# Patient Record
Sex: MALE | Race: WHITE | Employment: FULL TIME | URBAN - METROPOLITAN AREA
[De-identification: names, ages, dates, MRNs, and addresses within clinical notes are randomized per-mention and may not be internally consistent; named-entity substitution may affect disease eponyms.]

---

## 2017-11-20 PROBLEM — Z86.718 HISTORY OF DVT (DEEP VEIN THROMBOSIS): Status: ACTIVE | Noted: 2017-11-20

## 2017-11-21 ENCOUNTER — OFFICE VISIT (OUTPATIENT)
Dept: FAMILY MEDICINE CLINIC | Facility: CLINIC | Age: 47
End: 2017-11-21

## 2017-11-21 ENCOUNTER — HOSPITAL ENCOUNTER (OUTPATIENT)
Dept: ULTRASOUND IMAGING | Facility: HOSPITAL | Age: 47
Discharge: HOME | End: 2017-11-21
Attending: FAMILY MEDICINE
Payer: COMMERCIAL

## 2017-11-21 VITALS
HEART RATE: 66 BPM | DIASTOLIC BLOOD PRESSURE: 86 MMHG | BODY MASS INDEX: 27.2 KG/M2 | HEIGHT: 70 IN | SYSTOLIC BLOOD PRESSURE: 122 MMHG | OXYGEN SATURATION: 99 % | WEIGHT: 190 LBS | TEMPERATURE: 99 F

## 2017-11-21 DIAGNOSIS — Z23 FLU VACCINE NEED: ICD-10-CM

## 2017-11-21 DIAGNOSIS — Z86.718 HISTORY OF DVT (DEEP VEIN THROMBOSIS): ICD-10-CM

## 2017-11-21 DIAGNOSIS — M79.661 RIGHT CALF PAIN: ICD-10-CM

## 2017-11-21 DIAGNOSIS — M79.661 RIGHT CALF PAIN: Primary | ICD-10-CM

## 2017-11-21 PROCEDURE — 99203 OFFICE O/P NEW LOW 30 MIN: CPT | Performed by: FAMILY MEDICINE

## 2017-11-21 PROCEDURE — 93971 EXTREMITY STUDY: CPT | Performed by: FAMILY MEDICINE

## 2017-11-21 RX ORDER — WARFARIN SODIUM 10 MG/1
TABLET ORAL
Refills: 2 | COMMUNITY
Start: 2017-08-26 | End: 2018-01-16 | Stop reason: ALTCHOICE

## 2017-11-21 NOTE — PROGRESS NOTES
HPI:   Patient presents with:  Leg Pain: R leg had 2 DVT       Cris Ortiz is a 56year old male.     He primarily presents for:  Right posterior lower leg/calf tenderness and tingling, for a few days, patient with history of DVTs ×2 in similar locati palpitations  LUNG:  No SOB, cough or wheeze  GI:  No abdominal pain.   No GI/rectal bleeding, No N/V/D/C  :  No dysuria/hematuria  MS:  No joint pain or swelling B  NEURO: No numbness, tingling or weakness  PSYCH:  No mood concerns     EXAM:   /86 REFERRAL TO [address] [place] [address] HEMATOLOGY/ONCOLOGY GROUP    3. Flu vaccine need  Patient declined      The patient indicates understanding of the above recommendations and agrees to the above plan.   Follow up: as above      Orders Placed This Encounter      F

## 2017-11-22 NOTE — PROGRESS NOTES
I reviewed the results with the patient, patient aware and agrees to follow up plan: ECASA 325 mg qd, stop if SE/GERD, abd pain, etc

## 2018-01-16 ENCOUNTER — OFFICE VISIT (OUTPATIENT)
Dept: FAMILY MEDICINE CLINIC | Facility: CLINIC | Age: 48
End: 2018-01-16

## 2018-01-16 VITALS
DIASTOLIC BLOOD PRESSURE: 84 MMHG | BODY MASS INDEX: 27.92 KG/M2 | OXYGEN SATURATION: 99 % | TEMPERATURE: 98 F | WEIGHT: 195 LBS | HEIGHT: 70 IN | HEART RATE: 84 BPM | SYSTOLIC BLOOD PRESSURE: 130 MMHG

## 2018-01-16 DIAGNOSIS — M79.661 RIGHT CALF PAIN: Primary | ICD-10-CM

## 2018-01-16 DIAGNOSIS — I73.9 PVD (PERIPHERAL VASCULAR DISEASE) (HCC): ICD-10-CM

## 2018-01-16 DIAGNOSIS — R53.82 CHRONIC FATIGUE: ICD-10-CM

## 2018-01-16 DIAGNOSIS — G47.33 OBSTRUCTIVE SLEEP APNEA SYNDROME: ICD-10-CM

## 2018-01-16 DIAGNOSIS — Z86.718 HISTORY OF DVT (DEEP VEIN THROMBOSIS): ICD-10-CM

## 2018-01-16 DIAGNOSIS — L71.9 ROSACEA: ICD-10-CM

## 2018-01-16 PROCEDURE — 99214 OFFICE O/P EST MOD 30 MIN: CPT | Performed by: FAMILY MEDICINE

## 2018-01-16 RX ORDER — METRONIDAZOLE 10 MG/G
GEL TOPICAL
Qty: 60 G | Refills: 2 | Status: SHIPPED | OUTPATIENT
Start: 2018-01-16 | End: 2018-06-04

## 2018-01-16 RX ORDER — ASPIRIN 325 MG
325 TABLET ORAL DAILY
COMMUNITY
End: 2018-06-04

## 2018-01-16 NOTE — PROGRESS NOTES
HPI:   Patient presents with:  Deep Vein Thrombosis (cardiovascular)  Fatigue      Cris Ortiz is a 53year old male. He primarily presents for:  leg pain. Location of pain: Right calf. Has had pain for  2 months+.   Pain was caused by no known Outpatient Prescriptions:  aspirin 325 MG Oral Tab Take 325 mg by mouth daily.  Disp:  Rfl:    MetroNIDAZOLE 1 % External Gel Apply once to twice daily to AA of face Disp: 60 g Rfl: 2      Past Medical History:   Diagnosis Date   • DVT (deep venous thrombos tender posteriorly, no induration, no masses, no group B pains, no redness, no calor, no pitting edema bilaterally, no CCE left lower extremity; Brachial and PT/DP pulses wnl B  GI: NABS  NEURO: A&O x 3, CN II-XII intact B, motor and sensory grossly intact Apply once to twice daily to AA of face  Dispense: 60 g; Refill: 2    6.  PVD (peripheral vascular disease) (HCC)  Right lower extremity, status post DVT, recommend support stockings traveling/flying, reduce salt, elevate immobile, follow-up as needed

## 2018-02-20 ENCOUNTER — OFFICE VISIT (OUTPATIENT)
Dept: SLEEP CENTER | Age: 48
End: 2018-02-20
Attending: FAMILY MEDICINE
Payer: COMMERCIAL

## 2018-02-20 DIAGNOSIS — G47.33 OSA (OBSTRUCTIVE SLEEP APNEA): Primary | ICD-10-CM

## 2018-02-20 PROCEDURE — 95806 SLEEP STUDY UNATT&RESP EFFT: CPT

## 2018-02-25 NOTE — PROCEDURES
[facility]  Accredited by the [facility] ([facility])    PATIENT'S NAME: Vanessa Johnston   ATTENDING PHYSICIAN: Minnie Kumar MD   REFERRING PHYSICIAN: Minnie Kumar MD   PATIENT ACCOUNT #: [id number] beats per minute. The maximum heart rate was 114 beats per minute. INTERPRETATION:  The data generated from this study is consistent with moderate obstructive sleep apnea (ICD 10 code 47.33). RECOMMENDATIONS:    1. CPAP titration.   2.   Avoid alco

## 2018-03-09 ENCOUNTER — PATIENT MESSAGE (OUTPATIENT)
Dept: FAMILY MEDICINE CLINIC | Facility: CLINIC | Age: 48
End: 2018-03-09

## 2018-03-09 DIAGNOSIS — G47.33 OSA (OBSTRUCTIVE SLEEP APNEA): Primary | ICD-10-CM

## 2018-03-12 PROBLEM — G47.33 OSA (OBSTRUCTIVE SLEEP APNEA): Status: ACTIVE | Noted: 2018-03-12

## 2018-03-15 NOTE — TELEPHONE ENCOUNTER
Yael Kelley MD 3/13/2018 8:23 PM CDT    ----- Message from Peterson Sandoval MD sent at 3/13/2018 8:23 PM CDT -----  Please contact pt with Sleep center/CPAP titration order info-see referral and his emails to me  I believe the sleep

## 2018-03-15 NOTE — TELEPHONE ENCOUNTER
Spoke to pt, he was given the information for the [facility]; pt to call and schedule overnight study.

## 2018-06-04 ENCOUNTER — OFFICE VISIT (OUTPATIENT)
Dept: FAMILY MEDICINE CLINIC | Facility: CLINIC | Age: 48
End: 2018-06-04

## 2018-06-04 ENCOUNTER — HOSPITAL ENCOUNTER (OUTPATIENT)
Dept: ULTRASOUND IMAGING | Facility: HOSPITAL | Age: 48
Discharge: HOME | End: 2018-06-04
Attending: FAMILY MEDICINE
Payer: COMMERCIAL

## 2018-06-04 ENCOUNTER — HOSPITAL ENCOUNTER (EMERGENCY)
Facility: HOSPITAL | Age: 48
Discharge: HOME | End: 2018-06-04
Attending: EMERGENCY MEDICINE
Payer: COMMERCIAL

## 2018-06-04 VITALS
TEMPERATURE: 98 F | SYSTOLIC BLOOD PRESSURE: 119 MMHG | WEIGHT: 196 LBS | RESPIRATION RATE: 14 BRPM | BODY MASS INDEX: 28 KG/M2 | OXYGEN SATURATION: 99 % | DIASTOLIC BLOOD PRESSURE: 78 MMHG | HEART RATE: 62 BPM

## 2018-06-04 VITALS
RESPIRATION RATE: 18 BRPM | OXYGEN SATURATION: 98 % | HEIGHT: 70 IN | BODY MASS INDEX: 27.92 KG/M2 | HEART RATE: 89 BPM | SYSTOLIC BLOOD PRESSURE: 142 MMHG | DIASTOLIC BLOOD PRESSURE: 86 MMHG | WEIGHT: 195 LBS

## 2018-06-04 DIAGNOSIS — Z86.718 HISTORY OF DVT (DEEP VEIN THROMBOSIS): ICD-10-CM

## 2018-06-04 DIAGNOSIS — M79.605 LEFT LEG PAIN: ICD-10-CM

## 2018-06-04 DIAGNOSIS — I82.4Y2 ACUTE DEEP VEIN THROMBOSIS (DVT) OF PROXIMAL VEIN OF LEFT LOWER EXTREMITY (HCC): Primary | ICD-10-CM

## 2018-06-04 DIAGNOSIS — M79.605 LEFT LEG PAIN: Primary | ICD-10-CM

## 2018-06-04 PROCEDURE — 36415 COLL VENOUS BLD VENIPUNCTURE: CPT

## 2018-06-04 PROCEDURE — 99214 OFFICE O/P EST MOD 30 MIN: CPT | Performed by: FAMILY MEDICINE

## 2018-06-04 PROCEDURE — 93971 EXTREMITY STUDY: CPT | Performed by: FAMILY MEDICINE

## 2018-06-04 PROCEDURE — 85025 COMPLETE CBC W/AUTO DIFF WBC: CPT | Performed by: EMERGENCY MEDICINE

## 2018-06-04 PROCEDURE — 99283 EMERGENCY DEPT VISIT LOW MDM: CPT

## 2018-06-04 PROCEDURE — 80048 BASIC METABOLIC PNL TOTAL CA: CPT | Performed by: EMERGENCY MEDICINE

## 2018-06-04 RX ORDER — KETOCONAZOLE 20 MG/G
CREAM TOPICAL DAILY
COMMUNITY
End: 2018-08-22

## 2018-06-04 NOTE — ED NOTES
Assumed care of pt from triage. Pt states he awoke with pain behind left knee. Pt states he has 2 previous DVTs and has been on coumadin and lovenox in the past.  Pt states he is only currently taking Aspirin 325 mg PO. Pedal pulses 2+ b/l.   Pt is AOx4,

## 2018-06-04 NOTE — PROGRESS NOTES
HPI: 53year old male presents c/o L posterior leg pain x 1 day. BP elevated however in pain. Worse when walking or any L leg movement, improved with rest. Hx of DVT in R leg x2. Is taking ASA 325mg daily.  Says he had a prior hypercoag w/u which was negati B/L.  Skin: Skin color, texture, turgor normal. No rashes or lesions. Over 50% of this 25 minute visit was spent on counseling and coordination of care.     IMPRESSION:  L Leg Pain - r/o DVT  Hx of DVT '10 - on ASA 325mg qd    Patient Active Problem List

## 2018-06-05 NOTE — ED PROVIDER NOTES
Patient Seen in: [facility] Emergency Department    History   Patient presents with:  Deep Vein Thrombosis (cardiovascular)    Stated Complaint: Possible DVT     HPI    The patient is a 58-year-old male who presents with an acute DVT to his left l Oropharynx is clear and moist.   Eyes: Conjunctivae are normal.   Neck: Normal range of motion. Neck supple. Cardiovascular: Normal rate, regular rhythm, normal heart sounds and intact distal pulses.     Pulmonary/Chest: Effort normal and breath sounds no Acute-appearing occlusive deep venous thrombus involving the left popliteal vein. There is also extension of thrombus into superficial veins in the proximal left calf (superficial saphenous and gastrocnemius veins).      Dictated by (CST): RASHAWN Krueger

## 2018-06-25 ENCOUNTER — PATIENT MESSAGE (OUTPATIENT)
Dept: FAMILY MEDICINE CLINIC | Facility: CLINIC | Age: 48
End: 2018-06-25

## 2018-07-02 ENCOUNTER — PATIENT MESSAGE (OUTPATIENT)
Dept: FAMILY MEDICINE CLINIC | Facility: CLINIC | Age: 48
End: 2018-07-02

## 2018-07-02 NOTE — TELEPHONE ENCOUNTER
From: Yan Puentes  To: Leon Gusman MD  Sent: 7/2/2018 8:07 AM CDT  Subject: Prescription Question    Can you send prescription for Xarelto to [facility] on [address] in [place]?  My prescription from the Emergency Room doctor is

## 2018-07-06 NOTE — TELEPHONE ENCOUNTER
A refill request was received for:    Pending Prescriptions Disp Refills    rivaroxaban 15 MG Oral Tab 30 tablet 0     Sig: Take 1 tablet (15 mg total) by mouth daily with food.          Last refill date:  pt received after ER visit 6/4/18  Qty: 42 (to acco

## 2018-07-31 RX ORDER — RIVAROXABAN 15 MG/1
TABLET, FILM COATED ORAL
Qty: 30 TABLET | Refills: 0 | Status: SHIPPED | OUTPATIENT
Start: 2018-07-31 | End: 2019-01-29 | Stop reason: ALTCHOICE

## 2018-07-31 NOTE — TELEPHONE ENCOUNTER
A refill request was received for:    Pending Prescriptions Disp Refills    XARELTO 15 MG Oral Tab [Pharmacy Med Name: XARELTO 15MG TABLETS] 30 tablet 0      Sig: TAKE 1 TABLET(15 MG) BY MOUTH DAILY WITH FOOD         Last refill date: 7/6/18  Qty: 30  Last

## 2018-08-22 ENCOUNTER — OFFICE VISIT (OUTPATIENT)
Dept: FAMILY MEDICINE CLINIC | Facility: CLINIC | Age: 48
End: 2018-08-22
Payer: COMMERCIAL

## 2018-08-22 VITALS
HEART RATE: 108 BPM | WEIGHT: 190 LBS | SYSTOLIC BLOOD PRESSURE: 132 MMHG | HEIGHT: 70 IN | BODY MASS INDEX: 27.2 KG/M2 | RESPIRATION RATE: 18 BRPM | OXYGEN SATURATION: 97 % | DIASTOLIC BLOOD PRESSURE: 82 MMHG

## 2018-08-22 DIAGNOSIS — Z86.718 HISTORY OF DVT (DEEP VEIN THROMBOSIS): Primary | ICD-10-CM

## 2018-08-22 DIAGNOSIS — Z86.2 HISTORY OF COAGULOPATHY: ICD-10-CM

## 2018-08-22 DIAGNOSIS — Z79.899 LONG-TERM USE OF HIGH-RISK MEDICATION: ICD-10-CM

## 2018-08-22 DIAGNOSIS — L21.9 SEBORRHEIC DERMATITIS: ICD-10-CM

## 2018-08-22 DIAGNOSIS — F41.1 GENERALIZED ANXIETY DISORDER: ICD-10-CM

## 2018-08-22 PROCEDURE — 99214 OFFICE O/P EST MOD 30 MIN: CPT | Performed by: FAMILY MEDICINE

## 2018-08-22 RX ORDER — PROPRANOLOL HYDROCHLORIDE 10 MG/1
TABLET ORAL
Qty: 180 TABLET | Refills: 0 | Status: SHIPPED | OUTPATIENT
Start: 2018-08-22 | End: 2018-12-06

## 2018-08-22 RX ORDER — KETOCONAZOLE 20 MG/G
1 CREAM TOPICAL DAILY
Qty: 60 G | Refills: 2 | Status: SHIPPED | OUTPATIENT
Start: 2018-08-22 | End: 2019-11-25

## 2018-08-23 PROBLEM — F41.1 GENERALIZED ANXIETY DISORDER: Status: ACTIVE | Noted: 2018-08-23

## 2018-08-23 PROBLEM — L21.9 SEBORRHEIC DERMATITIS: Status: ACTIVE | Noted: 2018-08-23

## 2018-08-23 PROBLEM — Z79.899 LONG-TERM USE OF HIGH-RISK MEDICATION: Status: ACTIVE | Noted: 2018-08-23

## 2018-08-23 NOTE — PROGRESS NOTES
HPI:   Patient presents with:  Deep Vein Thrombosis (cardiovascular): patient denies leg pain today  Anxiety  Derm Problem      Eugene Marinelli is a 53year old male.     He primarily presents for:  Follow-up status post recent onset left DVT, patient wit 06/04/2018 06:15 PM   CO2 25 06/04/2018 06:15 PM   CREATSERUM 0.83 06/04/2018 06:15 PM   CA 9.6 06/04/2018 06:15 PM            Medications:    Current Outpatient Prescriptions:  Azelaic Acid 20 % External Cream Apply topically 2 (two) times daily.  Disp:  R nourished, male  in no apparent distress  SKIN: skin color wnl,  no suspicious lesions, few scattered flaky erythematous patches face  HEENT: atraumatic, normocephalic, nose clear; throat clear; dentition good   EARS: canals clear B, TM: wnl B  EYES: PERRL REFERRAL TO [facility] HEMATOLOGY/ONCOLOGY GROUP    3. Generalized anxiety disorder  Patient with worsening anxiety, especially situational, at work when public speaking, discussed treatment options, recommend propranolol 10 mg, 1-2 mg every morning a

## 2018-09-27 ENCOUNTER — PATIENT MESSAGE (OUTPATIENT)
Dept: FAMILY MEDICINE CLINIC | Facility: CLINIC | Age: 48
End: 2018-09-27

## 2018-12-06 DIAGNOSIS — F41.1 GENERALIZED ANXIETY DISORDER: ICD-10-CM

## 2018-12-07 RX ORDER — PROPRANOLOL HYDROCHLORIDE 10 MG/1
TABLET ORAL
Qty: 180 TABLET | Refills: 0 | Status: SHIPPED | OUTPATIENT
Start: 2018-12-07 | End: 2019-05-20

## 2019-01-29 ENCOUNTER — OFFICE VISIT (OUTPATIENT)
Dept: FAMILY MEDICINE CLINIC | Facility: CLINIC | Age: 49
End: 2019-01-29
Payer: COMMERCIAL

## 2019-01-29 VITALS
HEIGHT: 69.5 IN | HEART RATE: 97 BPM | BODY MASS INDEX: 27.65 KG/M2 | OXYGEN SATURATION: 98 % | RESPIRATION RATE: 18 BRPM | DIASTOLIC BLOOD PRESSURE: 82 MMHG | WEIGHT: 191 LBS | SYSTOLIC BLOOD PRESSURE: 126 MMHG

## 2019-01-29 DIAGNOSIS — Z79.899 LONG-TERM USE OF HIGH-RISK MEDICATION: ICD-10-CM

## 2019-01-29 DIAGNOSIS — Z00.00 ROUTINE MEDICAL EXAM: Primary | ICD-10-CM

## 2019-01-29 DIAGNOSIS — Z12.11 SCREENING FOR COLON CANCER: ICD-10-CM

## 2019-01-29 DIAGNOSIS — L21.9 SEBORRHEIC DERMATITIS: ICD-10-CM

## 2019-01-29 DIAGNOSIS — Z86.718 HISTORY OF DVT (DEEP VEIN THROMBOSIS): ICD-10-CM

## 2019-01-29 DIAGNOSIS — F41.1 GENERALIZED ANXIETY DISORDER: ICD-10-CM

## 2019-01-29 DIAGNOSIS — Z79.01 ON ANTICOAGULANT THERAPY: ICD-10-CM

## 2019-01-29 DIAGNOSIS — Z12.5 PROSTATE CANCER SCREENING: ICD-10-CM

## 2019-01-29 PROCEDURE — 99213 OFFICE O/P EST LOW 20 MIN: CPT | Performed by: FAMILY MEDICINE

## 2019-01-29 PROCEDURE — 99396 PREV VISIT EST AGE 40-64: CPT | Performed by: FAMILY MEDICINE

## 2019-01-29 PROCEDURE — 82274 ASSAY TEST FOR BLOOD FECAL: CPT | Performed by: FAMILY MEDICINE

## 2019-01-29 RX ORDER — BETAMETHASONE DIPROPIONATE 0.5 MG/G
LOTION TOPICAL
Qty: 60 ML | Refills: 1 | Status: SHIPPED | OUTPATIENT
Start: 2019-01-29 | End: 2019-11-25

## 2019-01-30 PROBLEM — Z79.01 ON ANTICOAGULANT THERAPY: Status: ACTIVE | Noted: 2019-01-30

## 2019-01-30 LAB — HEMOCCULT STL QL: NEGATIVE

## 2019-01-30 NOTE — H&P
HPI:   Patient presents with:  Physical  Deep Vein Thrombosis (cardiovascular)  Anxiety  Derm Problem      Edgar Salcedo is a 51year old male who presents for a complete physical exam.     Patient has complaints of:  Anxiety, generalized and situation Tab Take 1 tablet (5 mg total) by mouth 2 (two) times daily. Disp: 180 tablet Rfl: 0   ketoconazole 2 % External Cream Apply 1 Application topically daily.  Disp: 60 g Rfl: 2   Propranolol HCl 10 MG Oral Tab Take 1-2 tablets by mouth bid prn Disp: 180 table male in no apparent distress  SKIN: no suspicious lesions, skin color wnl  HEENT: atraumatic, normocephalic, nose and throat are clear; dentition good, EARS: canals clear, TM wnl B  EYES:PERRLA, EOMI, conjunctiva are clear, no discharge; no nystagmus  NECK COMP METABOLIC PANEL (14); Future  - CBC WITH DIFFERENTIAL WITH PLATELET; Future  - LIPID PANEL; Future  - PSA SCREEN; Future  - CT CALCIUM SCORING; Future    2. Prostate cancer screening  SA    3.  Screening for colon cancer  SA  - OCCULT BLOOD, FECAL, IMM

## 2019-01-31 NOTE — TELEPHONE ENCOUNTER
A refill request was received for:  Requested Prescriptions     Pending Prescriptions Disp Refills   • ELIQUIS 5 MG Oral Tab [Pharmacy Med Name: ELIQUIS 5MG TABLETS] 180 tablet 0     Sig: TAKE 1 TABLET(5 MG) BY MOUTH TWICE DAILY     Last refill date: 9/28/

## 2019-02-01 RX ORDER — APIXABAN 5 MG/1
TABLET, FILM COATED ORAL
Qty: 180 TABLET | Refills: 0 | Status: SHIPPED | OUTPATIENT
Start: 2019-02-01 | End: 2019-05-20

## 2019-02-04 ENCOUNTER — TELEPHONE (OUTPATIENT)
Dept: FAMILY MEDICINE CLINIC | Facility: CLINIC | Age: 49
End: 2019-02-04

## 2019-02-04 NOTE — TELEPHONE ENCOUNTER
Pt's wife calling, states she went to p/u Eliquis from pharmacy, cost is $1300 due to insurance deductible. Wife asking if there is an alternative to consider.  Wife given information for GoodRx (appears rx would cost $450 with GoodRx coupon), she will look

## 2019-02-08 NOTE — TELEPHONE ENCOUNTER
Called again, no answer, lmovm to wife Sandra Forman. Messaged patient via [employer] regarding rx alternative and to contact office.

## 2019-02-11 NOTE — TELEPHONE ENCOUNTER
ARACELIS to return call. Noticed [employer] message sent by MA was answered by the patient.

## 2019-05-17 ENCOUNTER — LAB ENCOUNTER (OUTPATIENT)
Dept: LAB | Facility: REFERENCE LAB | Age: 49
End: 2019-05-17
Attending: FAMILY MEDICINE
Payer: COMMERCIAL

## 2019-05-17 DIAGNOSIS — Z86.718 HISTORY OF DVT (DEEP VEIN THROMBOSIS): ICD-10-CM

## 2019-05-17 DIAGNOSIS — Z00.00 ROUTINE MEDICAL EXAM: ICD-10-CM

## 2019-05-17 DIAGNOSIS — Z79.01 ON ANTICOAGULANT THERAPY: ICD-10-CM

## 2019-05-17 DIAGNOSIS — Z79.899 LONG-TERM USE OF HIGH-RISK MEDICATION: ICD-10-CM

## 2019-05-17 PROCEDURE — 85025 COMPLETE CBC W/AUTO DIFF WBC: CPT | Performed by: FAMILY MEDICINE

## 2019-05-17 PROCEDURE — 36415 COLL VENOUS BLD VENIPUNCTURE: CPT | Performed by: FAMILY MEDICINE

## 2019-05-17 PROCEDURE — 80053 COMPREHEN METABOLIC PANEL: CPT | Performed by: FAMILY MEDICINE

## 2019-05-17 PROCEDURE — 84153 ASSAY OF PSA TOTAL: CPT | Performed by: FAMILY MEDICINE

## 2019-05-17 PROCEDURE — 80061 LIPID PANEL: CPT | Performed by: FAMILY MEDICINE

## 2019-05-20 DIAGNOSIS — F41.1 GENERALIZED ANXIETY DISORDER: ICD-10-CM

## 2019-05-21 RX ORDER — PROPRANOLOL HYDROCHLORIDE 10 MG/1
TABLET ORAL
Qty: 180 TABLET | Refills: 0 | Status: SHIPPED | OUTPATIENT
Start: 2019-05-21 | End: 2019-09-11

## 2019-05-21 RX ORDER — APIXABAN 5 MG/1
TABLET, FILM COATED ORAL
Qty: 180 TABLET | Refills: 0 | Status: SHIPPED | OUTPATIENT
Start: 2019-05-21 | End: 2019-09-11

## 2019-05-21 NOTE — TELEPHONE ENCOUNTER
A refill request was received for:  Requested Prescriptions     Pending Prescriptions Disp Refills   • ELIQUIS 5 MG Oral Tab [Pharmacy Med Name: ELIQUIS 5MG TABLETS] 180 tablet 0     Sig: TAKE 1 TABLET(5 MG) BY MOUTH TWICE DAILY     Last refill date:  2/1/

## 2019-08-23 ENCOUNTER — OFFICE VISIT (OUTPATIENT)
Dept: FAMILY MEDICINE CLINIC | Facility: CLINIC | Age: 49
End: 2019-08-23
Payer: COMMERCIAL

## 2019-08-23 VITALS
OXYGEN SATURATION: 98 % | HEART RATE: 68 BPM | RESPIRATION RATE: 18 BRPM | TEMPERATURE: 98 F | WEIGHT: 196 LBS | DIASTOLIC BLOOD PRESSURE: 82 MMHG | HEIGHT: 70 IN | BODY MASS INDEX: 28.06 KG/M2 | SYSTOLIC BLOOD PRESSURE: 112 MMHG

## 2019-08-23 DIAGNOSIS — Z79.899 LONG-TERM USE OF HIGH-RISK MEDICATION: ICD-10-CM

## 2019-08-23 DIAGNOSIS — F41.1 GENERALIZED ANXIETY DISORDER: ICD-10-CM

## 2019-08-23 DIAGNOSIS — Z79.01 ON ANTICOAGULANT THERAPY: ICD-10-CM

## 2019-08-23 DIAGNOSIS — G47.33 OSA (OBSTRUCTIVE SLEEP APNEA): ICD-10-CM

## 2019-08-23 DIAGNOSIS — L57.0 AK (ACTINIC KERATOSIS): ICD-10-CM

## 2019-08-23 DIAGNOSIS — Z86.718 HISTORY OF DVT (DEEP VEIN THROMBOSIS): Primary | ICD-10-CM

## 2019-08-23 DIAGNOSIS — Z87.891 FORMER TOBACCO USE: ICD-10-CM

## 2019-08-23 DIAGNOSIS — L71.9 ROSACEA: ICD-10-CM

## 2019-08-23 DIAGNOSIS — L21.9 SEBORRHEIC DERMATITIS: ICD-10-CM

## 2019-08-23 PROCEDURE — 99214 OFFICE O/P EST MOD 30 MIN: CPT | Performed by: FAMILY MEDICINE

## 2019-08-23 RX ORDER — IVERMECTIN 10 MG/G
CREAM TOPICAL
Refills: 1 | COMMUNITY
Start: 2019-08-21 | End: 2019-08-23

## 2019-08-23 NOTE — PROGRESS NOTES
HPI:   Patient presents with: Follow - Up: would like to discuss his blood tests  Deep Vein Thrombosis (cardiovascular)  Anxiety  Derm Problem      Ashley Man is a 51year old male.     He primarily presents for:  Follow-up left lower extremity recu 05/17/2019 08:32 AM    CA 9.0 05/17/2019 08:32 AM    ALB 4.0 05/17/2019 08:32 AM    TP 7.3 05/17/2019 08:32 AM    ALKPHO 57 05/17/2019 08:32 AM    AST 13 (L) 05/17/2019 08:32 AM    ALT 26 05/17/2019 08:32 AM    BILT 0.5 05/17/2019 08:32 AM            Medic body mass index is 28.12 kg/m² as calculated from the following:    Height as of this encounter: 70\". Weight as of this encounter: 196 lb.    Wt Readings from Last 3 Encounters:  08/23/19 : 196 lb  01/29/19 : 191 lb  08/22/18 : 190 lb    GENERAL: well d follow-up next office visit    6. Seborrheic dermatitis  Face, associated with rosacea and history of actinic keratosis, overall improved, will continue present management, follow-up with dermatology as recommended and with me in 6 months    7.  Rosacea  Ov

## 2019-09-11 DIAGNOSIS — F41.1 GENERALIZED ANXIETY DISORDER: ICD-10-CM

## 2019-09-11 RX ORDER — APIXABAN 5 MG/1
TABLET, FILM COATED ORAL
Qty: 180 TABLET | Refills: 0 | Status: SHIPPED | OUTPATIENT
Start: 2019-09-11 | End: 2019-11-25

## 2019-09-11 RX ORDER — PROPRANOLOL HYDROCHLORIDE 10 MG/1
TABLET ORAL
Qty: 180 TABLET | Refills: 0 | Status: SHIPPED | OUTPATIENT
Start: 2019-09-11 | End: 2020-12-09

## 2019-09-11 NOTE — TELEPHONE ENCOUNTER
Rx passes protocol - rx refilled. HTN; CMP in past 15 m, last CR < 2, apt in past 6 m     Return in about 6 months (around 2/23/2020) for DVT/anxiety/dermatitis/30.

## 2019-09-11 NOTE — TELEPHONE ENCOUNTER
A refill request was received for:  Requested Prescriptions     Pending Prescriptions Disp Refills   • ELIQUIS 5 MG Oral Tab [Pharmacy Med Name: ELIQUIS 5MG TABLETS] 180 tablet 0     Sig: TAKE 1 TABLET(5 MG) BY MOUTH TWICE DAILY     Last refill date: 5/21/

## 2019-10-08 ENCOUNTER — PATIENT MESSAGE (OUTPATIENT)
Dept: FAMILY MEDICINE CLINIC | Facility: CLINIC | Age: 49
End: 2019-10-08

## 2019-10-11 NOTE — TELEPHONE ENCOUNTER
From: Jaylon Carrera  To: Dante Martins MD  Sent: 10/8/2019 4:46 PM CDT  Subject: Other    I got a health screening through work. Would you like the lab results for record? If so what email can I send to?   Thanks,

## 2019-10-11 NOTE — PROGRESS NOTES
Rcvd pt's work wellness labs; lab results entered into the external results console. Additional lab results on report.

## 2019-11-25 DIAGNOSIS — L21.9 SEBORRHEIC DERMATITIS: ICD-10-CM

## 2019-11-25 DIAGNOSIS — Z86.718 HISTORY OF DVT (DEEP VEIN THROMBOSIS): Primary | ICD-10-CM

## 2019-11-26 RX ORDER — BETAMETHASONE DIPROPIONATE 0.5 MG/G
LOTION TOPICAL
Qty: 60 ML | Refills: 1 | Status: SHIPPED | OUTPATIENT
Start: 2019-11-26 | End: 2020-12-09

## 2019-11-26 RX ORDER — KETOCONAZOLE 20 MG/G
1 CREAM TOPICAL DAILY
Qty: 60 G | Refills: 1 | Status: SHIPPED | OUTPATIENT
Start: 2019-11-26 | End: 2020-06-01

## 2019-11-26 NOTE — TELEPHONE ENCOUNTER
Call pt-LHK I sent in RF(s) and remind that he is due for follow up with me:    February 2020  I am booking out 4-6 weeks so make appointment now if able. Thanks!

## 2020-03-27 DIAGNOSIS — Z86.718 HISTORY OF DVT (DEEP VEIN THROMBOSIS): ICD-10-CM

## 2020-03-30 ENCOUNTER — TELEPHONE (OUTPATIENT)
Dept: FAMILY MEDICINE CLINIC | Facility: CLINIC | Age: 50
End: 2020-03-30

## 2020-03-30 NOTE — TELEPHONE ENCOUNTER
Patient due for f/u visit, was due in February 2020. Please schedule him prior to us sending this refill.

## 2020-03-31 ENCOUNTER — VIRTUAL PHONE E/M (OUTPATIENT)
Dept: FAMILY MEDICINE CLINIC | Facility: CLINIC | Age: 50
End: 2020-03-31
Payer: COMMERCIAL

## 2020-03-31 DIAGNOSIS — Z79.899 LONG-TERM USE OF HIGH-RISK MEDICATION: ICD-10-CM

## 2020-03-31 DIAGNOSIS — Z86.718 HISTORY OF DVT (DEEP VEIN THROMBOSIS): Primary | ICD-10-CM

## 2020-03-31 DIAGNOSIS — F41.1 GENERALIZED ANXIETY DISORDER: ICD-10-CM

## 2020-03-31 PROCEDURE — 99213 OFFICE O/P EST LOW 20 MIN: CPT | Performed by: NURSE PRACTITIONER

## 2020-03-31 RX ORDER — APIXABAN 5 MG/1
TABLET, FILM COATED ORAL
Qty: 180 TABLET | Refills: 0 | OUTPATIENT
Start: 2020-03-31

## 2020-03-31 NOTE — PROGRESS NOTES
Virtual/Telephone Check-In    Cris Ortiz verbally consents to a Virtual/Telephone Check-In service on 03/31/20. Patient understands and accepts financial responsibility for any deductible, co-insurance and/or co-pays associated with this service. Denies SHIP. · Follow up in office in September/October 2020. · Call and/or go to the ER if worsening symptoms including, but not limited to: respiratory distress, shortness of breath and  wheezing, worsening fever, cough and mental status.       The

## 2020-04-20 ENCOUNTER — PATIENT MESSAGE (OUTPATIENT)
Dept: FAMILY MEDICINE CLINIC | Facility: CLINIC | Age: 50
End: 2020-04-20

## 2020-04-20 DIAGNOSIS — Z86.718 HISTORY OF DVT (DEEP VEIN THROMBOSIS): ICD-10-CM

## 2020-04-20 NOTE — TELEPHONE ENCOUNTER
From: Chauncey Cerrato  To: Bjorn Salvador MD  Sent: 4/20/2020 10:24 AM CDT  Subject: Prescription Question    I found a generic alternative to Eliquis at [facility] [facility] [place] Is this safe?  Could I get a new prescription for 3 months to submit

## 2020-04-20 NOTE — TELEPHONE ENCOUNTER
See pt's request for Eliquis Rx to be sent to [address] [place], [place] [address]      A refill request was received for:  Requested Prescriptions     Pending Prescriptions Disp Refills   • apixaban (ELIQUIS) 5 MG Oral Tab 180 tablet 0     Sig: Take 1 tablet (5 mg total) by

## 2020-04-21 ENCOUNTER — PATIENT MESSAGE (OUTPATIENT)
Dept: FAMILY MEDICINE CLINIC | Facility: CLINIC | Age: 50
End: 2020-04-21

## 2020-04-21 NOTE — TELEPHONE ENCOUNTER
Regarding: Prescription Question  Contact: [phone]  ----- Message from Alayna Medrano MD sent at 4/21/2020 10:17 AM CDT -----       ----- Message from Shanna Palacio to Bi Mukherjee MD sent at 4/20/2020 10:24 AM -----   I fo

## 2020-04-21 NOTE — TELEPHONE ENCOUNTER
I called and left a message. I just wanted to check and see if you would like you Eliquis to be mailed to you, faxed to your pharmacy or you would  like to pick it up? Asked patient to call me back to let me know. Repeat BAL and re-eval

## 2020-05-03 DIAGNOSIS — Z86.718 HISTORY OF DVT (DEEP VEIN THROMBOSIS): ICD-10-CM

## 2020-05-04 ENCOUNTER — TELEPHONE (OUTPATIENT)
Dept: FAMILY MEDICINE CLINIC | Facility: CLINIC | Age: 50
End: 2020-05-04

## 2020-05-04 DIAGNOSIS — Z86.718 HISTORY OF DVT (DEEP VEIN THROMBOSIS): ICD-10-CM

## 2020-05-04 RX ORDER — APIXABAN 5 MG/1
TABLET, FILM COATED ORAL
Qty: 180 TABLET | Refills: 0 | OUTPATIENT
Start: 2020-05-04

## 2020-05-04 NOTE — TELEPHONE ENCOUNTER
----- Message from Stefany Child RN sent at 5/4/2020 10:23 AM CDT -----  Regarding: FW:Eliquis prescription  Contact: [phone]    ----- Message -----  From: Maksim Lee  Sent: 5/3/2020   1:02 PM CDT  To: [facility] Clinical Staff  Subject: RE:Eliquis

## 2020-05-04 NOTE — TELEPHONE ENCOUNTER
This medication was sent to this pharmacy 1 week ago by Dr. Shantanu Win. Do they need me to re-send it? Not sure where that ended up. Thanks!

## 2020-05-04 NOTE — TELEPHONE ENCOUNTER
Spoke to patient he is almost out of his   apixaban (ELIQUIS) 5 MG Oral Tab bid. I faxed it to his mail service although he has not received it. He asked me to call in a 30 day supply to his [employer] which I did.

## 2020-06-01 DIAGNOSIS — Z86.718 HISTORY OF DVT (DEEP VEIN THROMBOSIS): ICD-10-CM

## 2020-06-01 DIAGNOSIS — L21.9 SEBORRHEIC DERMATITIS: ICD-10-CM

## 2020-06-02 RX ORDER — KETOCONAZOLE 20 MG/G
1 CREAM TOPICAL DAILY
Qty: 60 G | Refills: 1 | Status: SHIPPED | OUTPATIENT
Start: 2020-06-02 | End: 2020-10-09

## 2020-06-02 NOTE — TELEPHONE ENCOUNTER
A refill request was received for:  Requested Prescriptions     Pending Prescriptions Disp Refills   • ketoconazole 2 % External Cream 60 g 1     Sig: Apply 1 Application topically daily.    • apixaban (ELIQUIS) 5 MG Oral Tab 180 tablet 0     Sig: Take 1 ta

## 2020-09-15 DIAGNOSIS — Z86.718 HISTORY OF DVT (DEEP VEIN THROMBOSIS): ICD-10-CM

## 2020-09-15 NOTE — TELEPHONE ENCOUNTER
Patient due for follow-up visit 9/29/2020 per Dr. Suha Salas last note. Please schedule him so we can refill his medication. Thanks!

## 2020-09-15 NOTE — TELEPHONE ENCOUNTER
A refill request was received for:  Requested Prescriptions     Pending Prescriptions Disp Refills   • apixaban (ELIQUIS) 5 MG Oral Tab 180 tablet 0     Sig: Take 1 tablet (5 mg total) by mouth 2 (two) times daily.      Last refill date: 06/02/2020  Qty:180

## 2020-10-09 DIAGNOSIS — L21.9 SEBORRHEIC DERMATITIS: ICD-10-CM

## 2020-10-09 RX ORDER — KETOCONAZOLE 20 MG/G
CREAM TOPICAL
Qty: 60 G | Refills: 1 | Status: SHIPPED | OUTPATIENT
Start: 2020-10-09 | End: 2020-12-09

## 2020-10-09 NOTE — TELEPHONE ENCOUNTER
A refill request was received for:  Requested Prescriptions     Pending Prescriptions Disp Refills   • KETOCONAZOLE 2 % External Cream [Pharmacy Med Name: KETOCONAZOLE 2% CREAM 60GM] 60 g 1     Sig: APPLY EXTERNALLY TO THE AFFECTED AREA DAILY     Last refi

## 2020-10-28 ENCOUNTER — PATIENT MESSAGE (OUTPATIENT)
Dept: FAMILY MEDICINE CLINIC | Facility: CLINIC | Age: 50
End: 2020-10-28

## 2020-10-28 NOTE — TELEPHONE ENCOUNTER
From: Kenny Crockett  To: Kassidy Blackman MD  Sent: 10/28/2020 1:56 PM CDT  Subject: Other    Just got my annual labs back from work health screening, do you want me to send PDF of results?     Thanks,

## 2020-11-18 ENCOUNTER — PATIENT MESSAGE (OUTPATIENT)
Dept: FAMILY MEDICINE CLINIC | Facility: CLINIC | Age: 50
End: 2020-11-18

## 2020-11-18 DIAGNOSIS — Z86.718 HISTORY OF DVT (DEEP VEIN THROMBOSIS): ICD-10-CM

## 2020-11-24 ENCOUNTER — TELEMEDICINE (OUTPATIENT)
Dept: FAMILY MEDICINE CLINIC | Facility: CLINIC | Age: 50
End: 2020-11-24

## 2020-11-24 DIAGNOSIS — Z86.718 HISTORY OF DVT (DEEP VEIN THROMBOSIS): Primary | ICD-10-CM

## 2020-11-24 DIAGNOSIS — L21.9 SEBORRHEIC DERMATITIS: ICD-10-CM

## 2020-11-24 DIAGNOSIS — R03.0 TRANSIENT ELEVATED BLOOD PRESSURE: ICD-10-CM

## 2020-11-24 DIAGNOSIS — Z79.899 LONG-TERM USE OF HIGH-RISK MEDICATION: ICD-10-CM

## 2020-11-24 DIAGNOSIS — L71.9 ROSACEA: ICD-10-CM

## 2020-11-24 DIAGNOSIS — Z79.01 ON ANTICOAGULANT THERAPY: ICD-10-CM

## 2020-11-24 DIAGNOSIS — Z87.891 FORMER TOBACCO USE: ICD-10-CM

## 2020-11-24 DIAGNOSIS — F41.1 GENERALIZED ANXIETY DISORDER: ICD-10-CM

## 2020-11-24 PROCEDURE — 99214 OFFICE O/P EST MOD 30 MIN: CPT | Performed by: FAMILY MEDICINE

## 2020-11-24 NOTE — PROGRESS NOTES
Due to the real risk of possible exposure to Coronavirus (CoV-2, COVID-19) in the office/medical building and recommendations for social distancing (key to mitigation/limiting the spread of the virus) a Virtual or Telemedicine visit over the phone was perf No vision change or complaints  MOUTH/THROAT:  No sore throat   HEART:  No chest pain or palpitations  LUNG:  No SOB, no persistent cough or wheeze  GI:  No abdominal pain.  No N/V/D/C  :  No dysuria  MS:  No change  NEURO: No new complaints  PSYCH:  No n OV      No orders of the defined types were placed in this encounter.       Meds & Refills for this Visit:  Requested Prescriptions      No prescriptions requested or ordered in this encounter       None    Return in about 4 months (around 3/24/2021) for CP

## 2020-12-09 DIAGNOSIS — L21.9 SEBORRHEIC DERMATITIS: ICD-10-CM

## 2020-12-09 DIAGNOSIS — F41.1 GENERALIZED ANXIETY DISORDER: ICD-10-CM

## 2020-12-09 RX ORDER — PROPRANOLOL HYDROCHLORIDE 10 MG/1
TABLET ORAL
Qty: 180 TABLET | Refills: 0 | Status: SHIPPED | OUTPATIENT
Start: 2020-12-09 | End: 2021-10-14

## 2020-12-09 RX ORDER — BETAMETHASONE DIPROPIONATE 0.5 MG/G
LOTION TOPICAL
Qty: 60 ML | Refills: 1 | Status: SHIPPED | OUTPATIENT
Start: 2020-12-09 | End: 2021-10-14

## 2020-12-09 RX ORDER — KETOCONAZOLE 20 MG/G
1 CREAM TOPICAL DAILY
Qty: 60 G | Refills: 1 | Status: SHIPPED | OUTPATIENT
Start: 2020-12-09 | End: 2021-05-18

## 2021-03-22 ENCOUNTER — TELEPHONE (OUTPATIENT)
Dept: FAMILY MEDICINE CLINIC | Facility: CLINIC | Age: 51
End: 2021-03-22

## 2021-03-23 ENCOUNTER — PATIENT MESSAGE (OUTPATIENT)
Dept: FAMILY MEDICINE CLINIC | Facility: CLINIC | Age: 51
End: 2021-03-23

## 2021-03-23 DIAGNOSIS — Z86.718 HISTORY OF DVT (DEEP VEIN THROMBOSIS): ICD-10-CM

## 2021-03-23 NOTE — TELEPHONE ENCOUNTER
From: Lana Gray  To: Bisi Najera MD  Sent: 3/23/2021 8:49 AM CDT  Subject: Non-Urgent Medical Question    Hello, I received my first Covid vaccine shot on March 4,2021.  I received the [facility], however I got shot at [place] and they said

## 2021-03-23 NOTE — TELEPHONE ENCOUNTER
A refill request was received for:  Requested Prescriptions     Pending Prescriptions Disp Refills   • apixaban (ELIQUIS) 5 MG Oral Tab 180 tablet 0     Sig: Take 1 tablet (5 mg total) by mouth 2 (two) times daily.      Last refill date: 11/18/20  Qty:180

## 2021-05-18 DIAGNOSIS — L21.9 SEBORRHEIC DERMATITIS: ICD-10-CM

## 2021-05-18 NOTE — TELEPHONE ENCOUNTER
A refill request was received for:  Requested Prescriptions     Pending Prescriptions Disp Refills   • ketoconazole 2 % External Cream 60 g 1     Sig: Apply 1 Application topically daily.  APPLY TO AFFECTED AREA     Last refill date: 12/9/20  Qty:60g  Last

## 2021-06-09 DIAGNOSIS — L21.9 SEBORRHEIC DERMATITIS: ICD-10-CM

## 2021-06-09 RX ORDER — KETOCONAZOLE 20 MG/G
1 CREAM TOPICAL DAILY
Qty: 60 G | Refills: 1 | OUTPATIENT
Start: 2021-06-09

## 2021-06-10 ENCOUNTER — PATIENT MESSAGE (OUTPATIENT)
Dept: FAMILY MEDICINE CLINIC | Facility: CLINIC | Age: 51
End: 2021-06-10

## 2021-06-10 RX ORDER — KETOCONAZOLE 20 MG/G
1 CREAM TOPICAL DAILY
Qty: 60 G | Refills: 0 | Status: SHIPPED | OUTPATIENT
Start: 2021-06-10 | End: 2021-08-16

## 2021-06-10 NOTE — TELEPHONE ENCOUNTER
From: Lilly Alcaraz  To: Jesus Black MD  Sent: 6/10/2021 11:57 AM CDT  Subject: Prescription Question    Hello,  Did you send my prescription that I requested yesterday to pharmacy? It was ketoconozoles.     Thanks,

## 2021-07-08 ENCOUNTER — PATIENT MESSAGE (OUTPATIENT)
Dept: FAMILY MEDICINE CLINIC | Facility: CLINIC | Age: 51
End: 2021-07-08

## 2021-07-08 DIAGNOSIS — Z86.718 HISTORY OF DVT (DEEP VEIN THROMBOSIS): ICD-10-CM

## 2021-07-08 NOTE — TELEPHONE ENCOUNTER
From: Julieth Gale  Sent: 7/8/2021 1:36 PM CDT  To: [facility] Dr. Judy Alicea  Subject: RE: Prescription Question    I had an appointment scheduled for 6/22, Dr Joyner Like, I rescheduled for 7/13. I need the Eliquis.  [facility]

## 2021-07-13 ENCOUNTER — OFFICE VISIT (OUTPATIENT)
Dept: FAMILY MEDICINE CLINIC | Facility: CLINIC | Age: 51
End: 2021-07-13
Payer: COMMERCIAL

## 2021-07-13 ENCOUNTER — PATIENT MESSAGE (OUTPATIENT)
Dept: FAMILY MEDICINE CLINIC | Facility: CLINIC | Age: 51
End: 2021-07-13

## 2021-07-13 VITALS
HEIGHT: 70 IN | HEART RATE: 75 BPM | SYSTOLIC BLOOD PRESSURE: 126 MMHG | DIASTOLIC BLOOD PRESSURE: 80 MMHG | BODY MASS INDEX: 28.03 KG/M2 | OXYGEN SATURATION: 98 % | WEIGHT: 195.81 LBS

## 2021-07-13 DIAGNOSIS — G47.33 OSA (OBSTRUCTIVE SLEEP APNEA): ICD-10-CM

## 2021-07-13 DIAGNOSIS — Z00.00 ROUTINE MEDICAL EXAM: Primary | ICD-10-CM

## 2021-07-13 DIAGNOSIS — Z12.11 SCREENING FOR COLON CANCER: ICD-10-CM

## 2021-07-13 DIAGNOSIS — Z86.718 HISTORY OF DVT (DEEP VEIN THROMBOSIS): ICD-10-CM

## 2021-07-13 DIAGNOSIS — Z79.01 ON ANTICOAGULANT THERAPY: ICD-10-CM

## 2021-07-13 DIAGNOSIS — Z79.899 LONG-TERM USE OF HIGH-RISK MEDICATION: ICD-10-CM

## 2021-07-13 DIAGNOSIS — F41.1 GENERALIZED ANXIETY DISORDER: ICD-10-CM

## 2021-07-13 DIAGNOSIS — Z12.5 PROSTATE CANCER SCREENING: ICD-10-CM

## 2021-07-13 PROCEDURE — 3079F DIAST BP 80-89 MM HG: CPT | Performed by: FAMILY MEDICINE

## 2021-07-13 PROCEDURE — 3008F BODY MASS INDEX DOCD: CPT | Performed by: FAMILY MEDICINE

## 2021-07-13 PROCEDURE — 99213 OFFICE O/P EST LOW 20 MIN: CPT | Performed by: FAMILY MEDICINE

## 2021-07-13 PROCEDURE — 3074F SYST BP LT 130 MM HG: CPT | Performed by: FAMILY MEDICINE

## 2021-07-13 PROCEDURE — 99396 PREV VISIT EST AGE 40-64: CPT | Performed by: FAMILY MEDICINE

## 2021-07-13 NOTE — H&P
HPI:   Patient presents with:  Physical      Eugene Marinelli is a 49year old male who presents for a complete physical exam.     Patient has complaints of:  Anxiety  Mood is same. He complains of: feeling anxious, excessive worry.   He denies: depressio Betamethasone Dipropionate 0.05 % External Lotion 1 application to AA of face qd prn 60 mL 1      Past Medical History:   Diagnosis Date   • DVT (deep venous thrombosis) (HCC)    • Skin cancer       History reviewed. No pertinent surgical history.    Histor discharge; no nystagmus  NECK: supple, no LAD, no TM, no carotid bruits or JVD B  LUNGS: good BS B, clear to auscultation B, no wheezing and no rales or rhonchi B   CARDIO: RRR without murmur, NL S1 S2, no S3 S4  EXT: no CCE, PT and DP pulses WNL B UE/LE Future  - CT CALCIUM SCORING; Future    2. History of DVT (deep vein thrombosis)  R LE x2, L LE x1, asym now, on eliquis, will CPM, chk CBC as above, f/u P and in 6 mo  We have discussed the proper use, risks and benefits of his medication(s).  The patient

## 2021-07-13 NOTE — TELEPHONE ENCOUNTER
From: Ashley Man  To:  Yady Snow MD  Sent: 7/13/2021 3:26 PM CDT  Subject: Visit Follow-up Question    Hello,    Looking at the notes from this morning's physical exam, I do not recall stating that I am currently anxious or excessive worr

## 2021-08-06 ENCOUNTER — LAB ENCOUNTER (OUTPATIENT)
Dept: LAB | Age: 51
End: 2021-08-06
Attending: FAMILY MEDICINE
Payer: COMMERCIAL

## 2021-08-06 DIAGNOSIS — Z12.5 PROSTATE CANCER SCREENING: ICD-10-CM

## 2021-08-06 DIAGNOSIS — Z00.00 ROUTINE MEDICAL EXAM: ICD-10-CM

## 2021-08-06 LAB
ALBUMIN SERPL-MCNC: 3.9 G/DL (ref 3.4–5)
ALBUMIN/GLOB SERPL: 1.1 {RATIO} (ref 1–2)
ALP LIVER SERPL-CCNC: 55 U/L
ALT SERPL-CCNC: 46 U/L
ANION GAP SERPL CALC-SCNC: 7 MMOL/L (ref 0–18)
AST SERPL-CCNC: 33 U/L (ref 15–37)
BASOPHILS # BLD AUTO: 0.08 X10(3) UL (ref 0–0.2)
BASOPHILS NFR BLD AUTO: 1.7 %
BILIRUB SERPL-MCNC: 0.7 MG/DL (ref 0.1–2)
BUN BLD-MCNC: 15 MG/DL (ref 7–18)
BUN/CREAT SERPL: 15.2 (ref 10–20)
CALCIUM BLD-MCNC: 9.1 MG/DL (ref 8.5–10.1)
CHLORIDE SERPL-SCNC: 107 MMOL/L (ref 98–112)
CHOLEST SMN-MCNC: 203 MG/DL (ref ?–200)
CO2 SERPL-SCNC: 26 MMOL/L (ref 21–32)
CREAT BLD-MCNC: 0.99 MG/DL
DEPRECATED RDW RBC AUTO: 42.8 FL (ref 35.1–46.3)
EOSINOPHIL # BLD AUTO: 0.36 X10(3) UL (ref 0–0.7)
EOSINOPHIL NFR BLD AUTO: 7.5 %
ERYTHROCYTE [DISTWIDTH] IN BLOOD BY AUTOMATED COUNT: 11.9 % (ref 11–15)
GLOBULIN PLAS-MCNC: 3.6 G/DL (ref 2.8–4.4)
GLUCOSE BLD-MCNC: 89 MG/DL (ref 70–99)
HCT VFR BLD AUTO: 44.6 %
HDLC SERPL-MCNC: 106 MG/DL (ref 40–59)
HGB BLD-MCNC: 14.8 G/DL
IMM GRANULOCYTES # BLD AUTO: 0.02 X10(3) UL (ref 0–1)
IMM GRANULOCYTES NFR BLD: 0.4 %
LDLC SERPL CALC-MCNC: 84 MG/DL (ref ?–100)
LYMPHOCYTES # BLD AUTO: 1.32 X10(3) UL (ref 1–4)
LYMPHOCYTES NFR BLD AUTO: 27.6 %
M PROTEIN MFR SERPL ELPH: 7.5 G/DL (ref 6.4–8.2)
MCH RBC QN AUTO: 32.4 PG (ref 26–34)
MCHC RBC AUTO-ENTMCNC: 33.2 G/DL (ref 31–37)
MCV RBC AUTO: 97.6 FL
MONOCYTES # BLD AUTO: 0.53 X10(3) UL (ref 0.1–1)
MONOCYTES NFR BLD AUTO: 11.1 %
NEUTROPHILS # BLD AUTO: 2.47 X10 (3) UL (ref 1.5–7.7)
NEUTROPHILS # BLD AUTO: 2.47 X10(3) UL (ref 1.5–7.7)
NEUTROPHILS NFR BLD AUTO: 51.7 %
NONHDLC SERPL-MCNC: 97 MG/DL (ref ?–130)
OSMOLALITY SERPL CALC.SUM OF ELEC: 290 MOSM/KG (ref 275–295)
PATIENT FASTING Y/N/NP: YES
PATIENT FASTING Y/N/NP: YES
PLATELET # BLD AUTO: 216 10(3)UL (ref 150–450)
POTASSIUM SERPL-SCNC: 4.3 MMOL/L (ref 3.5–5.1)
PSA SERPL-MCNC: 1.66 NG/ML (ref ?–4)
RBC # BLD AUTO: 4.57 X10(6)UL
SODIUM SERPL-SCNC: 140 MMOL/L (ref 136–145)
TRIGL SERPL-MCNC: 71 MG/DL (ref 30–149)
TSI SER-ACNC: 1.01 MIU/ML (ref 0.36–3.74)
VLDLC SERPL CALC-MCNC: 11 MG/DL (ref 0–30)
WBC # BLD AUTO: 4.8 X10(3) UL (ref 4–11)

## 2021-08-06 PROCEDURE — 80053 COMPREHEN METABOLIC PANEL: CPT

## 2021-08-06 PROCEDURE — 84153 ASSAY OF PSA TOTAL: CPT

## 2021-08-06 PROCEDURE — 84443 ASSAY THYROID STIM HORMONE: CPT

## 2021-08-06 PROCEDURE — 85025 COMPLETE CBC W/AUTO DIFF WBC: CPT

## 2021-08-06 PROCEDURE — 36415 COLL VENOUS BLD VENIPUNCTURE: CPT

## 2021-08-06 PROCEDURE — 80061 LIPID PANEL: CPT

## 2021-08-16 DIAGNOSIS — L21.9 SEBORRHEIC DERMATITIS: ICD-10-CM

## 2021-08-16 RX ORDER — KETOCONAZOLE 20 MG/G
1 CREAM TOPICAL DAILY
Qty: 60 G | Refills: 0 | Status: SHIPPED | OUTPATIENT
Start: 2021-08-16 | End: 2021-10-14

## 2021-08-16 NOTE — TELEPHONE ENCOUNTER
A refill request was received for:  Requested Prescriptions     Pending Prescriptions Disp Refills   • ketoconazole 2 % External Cream 60 g 0     Sig: Apply 1 Application topically daily.  APPLY TO AFFECTED AREA     Last refill date: 6/10/21  Qty:60g  Last

## 2021-10-04 ENCOUNTER — PATIENT MESSAGE (OUTPATIENT)
Dept: FAMILY MEDICINE CLINIC | Facility: CLINIC | Age: 51
End: 2021-10-04

## 2021-10-04 DIAGNOSIS — Z30.09 VASECTOMY EVALUATION: Primary | ICD-10-CM

## 2021-10-04 NOTE — TELEPHONE ENCOUNTER
From: Ro Rdz  To: Lizet Hopson MD  Sent: 10/4/2021 8:30 AM CDT  Subject: Vasectomy    Could you direct me to who can perform this for me?     Thanks,

## 2021-10-14 DIAGNOSIS — F41.1 GENERALIZED ANXIETY DISORDER: ICD-10-CM

## 2021-10-14 DIAGNOSIS — Z86.718 HISTORY OF DVT (DEEP VEIN THROMBOSIS): ICD-10-CM

## 2021-10-14 DIAGNOSIS — L21.9 SEBORRHEIC DERMATITIS: ICD-10-CM

## 2021-10-14 RX ORDER — PROPRANOLOL HYDROCHLORIDE 10 MG/1
TABLET ORAL
Qty: 180 TABLET | Refills: 0 | Status: SHIPPED | OUTPATIENT
Start: 2021-10-14

## 2021-10-14 RX ORDER — KETOCONAZOLE 20 MG/G
1 CREAM TOPICAL DAILY
Qty: 60 G | Refills: 0 | Status: SHIPPED | OUTPATIENT
Start: 2021-10-14 | End: 2021-12-18

## 2021-10-14 RX ORDER — BETAMETHASONE DIPROPIONATE 0.5 MG/G
LOTION TOPICAL
Qty: 60 ML | Refills: 1 | Status: SHIPPED | OUTPATIENT
Start: 2021-10-14

## 2021-10-14 NOTE — TELEPHONE ENCOUNTER
A refill request was received for:  Requested Prescriptions     Pending Prescriptions Disp Refills   • propranolol 10 MG Oral Tab 180 tablet 0     Sig: Take 1-2 tablets by mouth bid prn   • Betamethasone Dipropionate 0.05 % External Lotion 60 mL 1     Sig:

## 2021-10-25 ENCOUNTER — TELEPHONE (OUTPATIENT)
Dept: FAMILY MEDICINE CLINIC | Facility: CLINIC | Age: 51
End: 2021-10-25

## 2021-12-18 DIAGNOSIS — L21.9 SEBORRHEIC DERMATITIS: ICD-10-CM

## 2021-12-20 RX ORDER — KETOCONAZOLE 20 MG/G
1 CREAM TOPICAL DAILY
Qty: 60 G | Refills: 0 | Status: SHIPPED | OUTPATIENT
Start: 2021-12-20

## 2021-12-20 NOTE — TELEPHONE ENCOUNTER
A refill request was received for:  Requested Prescriptions     Pending Prescriptions Disp Refills   • ketoconazole 2 % External Cream 60 g 0     Sig: Apply 1 Application topically daily.  APPLY TO AFFECTED AREA     Last refill date: 10/14/21  Qty:60 g  Las

## 2022-02-07 DIAGNOSIS — Z86.718 HISTORY OF DVT (DEEP VEIN THROMBOSIS): ICD-10-CM

## 2022-02-15 ENCOUNTER — TELEPHONE (OUTPATIENT)
Dept: FAMILY MEDICINE CLINIC | Facility: CLINIC | Age: 52
End: 2022-02-15

## 2022-03-09 RX ORDER — KETOCONAZOLE 20 MG/G
1 CREAM TOPICAL DAILY
Qty: 60 G | Refills: 0 | Status: SHIPPED | OUTPATIENT
Start: 2022-03-09

## 2022-03-09 RX ORDER — KETOCONAZOLE 20 MG/G
CREAM TOPICAL
Qty: 60 G | Refills: 0 | OUTPATIENT
Start: 2022-03-09

## 2022-03-25 ENCOUNTER — OFFICE VISIT (OUTPATIENT)
Dept: FAMILY MEDICINE CLINIC | Facility: CLINIC | Age: 52
End: 2022-03-25
Payer: COMMERCIAL

## 2022-03-25 VITALS
HEART RATE: 73 BPM | SYSTOLIC BLOOD PRESSURE: 132 MMHG | RESPIRATION RATE: 16 BRPM | DIASTOLIC BLOOD PRESSURE: 80 MMHG | OXYGEN SATURATION: 97 % | HEIGHT: 70 IN | WEIGHT: 197 LBS | BODY MASS INDEX: 28.2 KG/M2

## 2022-03-25 DIAGNOSIS — G47.33 OSA (OBSTRUCTIVE SLEEP APNEA): ICD-10-CM

## 2022-03-25 DIAGNOSIS — Z12.11 SCREENING FOR COLON CANCER: ICD-10-CM

## 2022-03-25 DIAGNOSIS — Z79.01 ON ANTICOAGULANT THERAPY: ICD-10-CM

## 2022-03-25 DIAGNOSIS — L71.9 ROSACEA: ICD-10-CM

## 2022-03-25 DIAGNOSIS — Z79.899 LONG-TERM USE OF HIGH-RISK MEDICATION: ICD-10-CM

## 2022-03-25 DIAGNOSIS — F41.1 GENERALIZED ANXIETY DISORDER: ICD-10-CM

## 2022-03-25 DIAGNOSIS — Z87.891 FORMER TOBACCO USE: ICD-10-CM

## 2022-03-25 DIAGNOSIS — Z86.718 HISTORY OF DVT (DEEP VEIN THROMBOSIS): Primary | ICD-10-CM

## 2022-03-25 DIAGNOSIS — Z23 NEED FOR SHINGLES VACCINE: ICD-10-CM

## 2022-03-25 PROCEDURE — 3008F BODY MASS INDEX DOCD: CPT | Performed by: FAMILY MEDICINE

## 2022-03-25 PROCEDURE — 3079F DIAST BP 80-89 MM HG: CPT | Performed by: FAMILY MEDICINE

## 2022-03-25 PROCEDURE — 3075F SYST BP GE 130 - 139MM HG: CPT | Performed by: FAMILY MEDICINE

## 2022-03-25 PROCEDURE — 99214 OFFICE O/P EST MOD 30 MIN: CPT | Performed by: FAMILY MEDICINE

## 2022-04-19 RX ORDER — KETOCONAZOLE 20 MG/G
1 CREAM TOPICAL DAILY
Qty: 60 G | Refills: 0 | Status: SHIPPED | OUTPATIENT
Start: 2022-04-19

## 2022-05-25 DIAGNOSIS — F41.1 GENERALIZED ANXIETY DISORDER: ICD-10-CM

## 2022-05-25 DIAGNOSIS — Z86.718 HISTORY OF DVT (DEEP VEIN THROMBOSIS): ICD-10-CM

## 2022-05-25 RX ORDER — PROPRANOLOL HYDROCHLORIDE 10 MG/1
TABLET ORAL
Qty: 180 TABLET | Refills: 0 | Status: SHIPPED | OUTPATIENT
Start: 2022-05-25

## 2022-06-10 DIAGNOSIS — L21.9 SEBORRHEIC DERMATITIS: ICD-10-CM

## 2022-06-10 RX ORDER — KETOCONAZOLE 20 MG/G
1 CREAM TOPICAL DAILY
Qty: 60 G | Refills: 2 | Status: SHIPPED | OUTPATIENT
Start: 2022-06-10

## 2022-07-13 RX ORDER — APIXABAN 5 MG/1
TABLET, FILM COATED ORAL
Qty: 180 TABLET | Refills: 0 | OUTPATIENT
Start: 2022-07-13

## 2022-08-09 DIAGNOSIS — L21.9 SEBORRHEIC DERMATITIS: ICD-10-CM

## 2022-08-09 RX ORDER — KETOCONAZOLE 20 MG/G
1 CREAM TOPICAL DAILY
Qty: 60 G | Refills: 2 | Status: SHIPPED | OUTPATIENT
Start: 2022-08-09

## 2022-09-06 ENCOUNTER — PATIENT MESSAGE (OUTPATIENT)
Dept: FAMILY MEDICINE CLINIC | Facility: CLINIC | Age: 52
End: 2022-09-06

## 2022-09-06 DIAGNOSIS — Z86.718 HISTORY OF DVT (DEEP VEIN THROMBOSIS): ICD-10-CM

## 2022-09-06 NOTE — TELEPHONE ENCOUNTER
From: [facility]  To: Mary Beth Simmons MD  Sent: 9/6/2022 6:14 PM CDT  Subject: Appointment to fill Prescription    I will schedule appointment at the end of the month (6th months since last appointment date.) I ask that you refill Eliquis as I am almost out.     Thanks,

## 2022-09-08 RX ORDER — APIXABAN 5 MG/1
TABLET, FILM COATED ORAL
Qty: 180 TABLET | Refills: 0 | OUTPATIENT
Start: 2022-09-08

## 2022-09-21 DIAGNOSIS — L21.9 SEBORRHEIC DERMATITIS: ICD-10-CM

## 2022-09-21 RX ORDER — BETAMETHASONE DIPROPIONATE 0.5 MG/G
LOTION TOPICAL
Qty: 60 ML | Refills: 1 | Status: SHIPPED | OUTPATIENT
Start: 2022-09-21

## 2022-09-21 RX ORDER — KETOCONAZOLE 20 MG/G
1 CREAM TOPICAL DAILY
Qty: 60 G | Refills: 2 | Status: SHIPPED | OUTPATIENT
Start: 2022-09-21

## 2022-10-18 DIAGNOSIS — F41.1 GENERALIZED ANXIETY DISORDER: ICD-10-CM

## 2022-10-18 DIAGNOSIS — L21.9 SEBORRHEIC DERMATITIS: ICD-10-CM

## 2022-10-19 RX ORDER — KETOCONAZOLE 20 MG/G
1 CREAM TOPICAL DAILY
Qty: 60 G | Refills: 2 | Status: SHIPPED | OUTPATIENT
Start: 2022-10-19

## 2022-10-19 RX ORDER — PROPRANOLOL HYDROCHLORIDE 10 MG/1
TABLET ORAL
Qty: 180 TABLET | Refills: 0 | Status: SHIPPED | OUTPATIENT
Start: 2022-10-19

## 2022-12-09 ENCOUNTER — OFFICE VISIT (OUTPATIENT)
Dept: FAMILY MEDICINE CLINIC | Facility: CLINIC | Age: 52
End: 2022-12-09
Payer: COMMERCIAL

## 2022-12-09 VITALS
DIASTOLIC BLOOD PRESSURE: 102 MMHG | RESPIRATION RATE: 18 BRPM | HEART RATE: 75 BPM | WEIGHT: 197 LBS | SYSTOLIC BLOOD PRESSURE: 150 MMHG | BODY MASS INDEX: 28.2 KG/M2 | HEIGHT: 70 IN

## 2022-12-09 DIAGNOSIS — R03.0 ELEVATED BLOOD PRESSURE READING: ICD-10-CM

## 2022-12-09 DIAGNOSIS — Z85.828 HISTORY OF BASAL CELL CANCER: Primary | ICD-10-CM

## 2022-12-09 DIAGNOSIS — Z79.01 ON ANTICOAGULANT THERAPY: ICD-10-CM

## 2022-12-09 DIAGNOSIS — Z86.718 HISTORY OF DVT (DEEP VEIN THROMBOSIS): ICD-10-CM

## 2022-12-09 PROCEDURE — 3080F DIAST BP >= 90 MM HG: CPT | Performed by: STUDENT IN AN ORGANIZED HEALTH CARE EDUCATION/TRAINING PROGRAM

## 2022-12-09 PROCEDURE — 3077F SYST BP >= 140 MM HG: CPT | Performed by: STUDENT IN AN ORGANIZED HEALTH CARE EDUCATION/TRAINING PROGRAM

## 2022-12-09 PROCEDURE — 99203 OFFICE O/P NEW LOW 30 MIN: CPT | Performed by: STUDENT IN AN ORGANIZED HEALTH CARE EDUCATION/TRAINING PROGRAM

## 2022-12-09 PROCEDURE — 3008F BODY MASS INDEX DOCD: CPT | Performed by: STUDENT IN AN ORGANIZED HEALTH CARE EDUCATION/TRAINING PROGRAM

## 2023-04-08 ENCOUNTER — PATIENT OUTREACH (OUTPATIENT)
Dept: FAMILY MEDICINE CLINIC | Facility: CLINIC | Age: 53
End: 2023-04-08

## 2023-05-19 ENCOUNTER — PATIENT MESSAGE (OUTPATIENT)
Dept: FAMILY MEDICINE CLINIC | Facility: CLINIC | Age: 53
End: 2023-05-19

## 2023-05-19 DIAGNOSIS — F41.1 GENERALIZED ANXIETY DISORDER: ICD-10-CM

## 2023-05-19 DIAGNOSIS — L21.9 SEBORRHEIC DERMATITIS: ICD-10-CM

## 2023-05-19 NOTE — TELEPHONE ENCOUNTER
From: Virginie Mejia  To: Dalila Ji MD  Sent: 5/19/2023 9:04 AM CDT  Subject: Prescriptions    It seems I cannot request refills on all prescriptions other than Eliquis? I would like to request refills on some of the other ones.     Thanks,

## 2023-05-22 RX ORDER — PROPRANOLOL HYDROCHLORIDE 10 MG/1
TABLET ORAL
Qty: 180 TABLET | Refills: 0
Start: 2023-05-22

## 2023-05-22 RX ORDER — BETAMETHASONE DIPROPIONATE 0.5 MG/G
LOTION TOPICAL
Qty: 60 ML | Refills: 1
Start: 2023-05-22

## 2023-05-22 RX ORDER — KETOCONAZOLE 20 MG/G
1 CREAM TOPICAL DAILY
Qty: 60 G | Refills: 2
Start: 2023-05-22

## 2023-05-22 NOTE — TELEPHONE ENCOUNTER
Please review; protocol failed/No Protcol    Requested Prescriptions   Pending Prescriptions Disp Refills    propranolol 10 MG Oral Tab 180 tablet 0     Sig: Take 1-2 tablets by mouth bid prn       Hypertensive Medications Protocol Failed - 5/22/2023 11:30 AM        Failed - Last BP reading less than 140/90     BP Readings from Last 1 Encounters:  12/09/22 : (!) 150/102                Failed - CMP or BMP in past 6 months     No results found for this or any previous visit (from the past 4392 hour(s)).               Failed - EGFRCR or GFRNAA > 50     GFR Evaluation              Passed - In person appointment in the past 12 or next 3 months     Recent Outpatient Visits              5 months ago History of basal cell cancer    Fonnie Rundle, [place], Romana Merritts, MD    Office Visit    1 year ago History of DVT (deep vein thrombosis)    [facility], Maria Luz Serrano MD    Office Visit    1 year ago Routine medical exam    [facility]    Office Visit    2 years ago History of DVT (deep vein thrombosis)    [facility], Maria Luz Serrano MD    Telemedicine    3 years ago History of DVT (deep vein thrombosis)    [place], [place] ADY Mcbride    Virtual Phone E/M          Future Appointments         Provider Department Appt Notes    In 1 month Jhoan Triana MD [facility], [address] [place] [address] Annual Physical               Passed - In person appointment or virtual visit in the past 6 months     Recent Outpatient Visits              5 months ago History of basal cell cancer    Gustavo Turcios MD    Office Visit    1 year ago History of DVT (deep vein thrombosis) [facility], [address] Cristine Waters MD    Office Visit    1 year ago Routine medical exam    [address], [address], Clarke Endo, MD    Office Visit    2 years ago History of DVT (deep vein thrombosis)    [facility], [facility] [address], Clarke Endo, MD    Telemedicine    3 years ago History of DVT (deep vein thrombosis)    [facility], [facility] [address], APRJUMA    Virtual Phone E/M          Future Appointments         Provider Department Appt Notes    In 1 month MD Timur Hoyt [place] Annual Physical                 ketoconazole 2 % External Cream 60 g 2     Sig: Apply 1 Application. topically daily.  APPLY TO AFFECTED AREA       There is no refill protocol information for this order       betamethasone dipropionate 0.05 % External Lotion 60 mL 1     Si application to AA of face qd prn       There is no refill protocol information for this order          Recent Outpatient Visits              5 months ago History of basal cell cancer    Mira Cantu Cinthia Lazar, MD    Office Visit    1 year ago History of DVT (deep vein thrombosis)    [facility], [facility] [address], Jenny Albarran MD    Office Visit    1 year ago Routine medical exam    [address], [address] [facility]    Office Visit    2 years ago History of DVT (deep vein thrombosis)    [facility], [facility] [address] Zaki maddox MD    Telemedicine    3 years ago History of DVT (deep vein thrombosis)    [facility], [facility] [address], APRJUMA    Virtual Phone E/M            Future Appointments Provider Department Appt Notes    In 1 month MD Nida Sullivan, [facility]

## 2023-07-14 ENCOUNTER — LAB ENCOUNTER (OUTPATIENT)
Dept: LAB | Age: 53
End: 2023-07-14
Attending: STUDENT IN AN ORGANIZED HEALTH CARE EDUCATION/TRAINING PROGRAM
Payer: COMMERCIAL

## 2023-07-14 ENCOUNTER — OFFICE VISIT (OUTPATIENT)
Dept: FAMILY MEDICINE CLINIC | Facility: CLINIC | Age: 53
End: 2023-07-14

## 2023-07-14 VITALS
OXYGEN SATURATION: 99 % | HEIGHT: 70 IN | BODY MASS INDEX: 25.37 KG/M2 | SYSTOLIC BLOOD PRESSURE: 129 MMHG | WEIGHT: 177.19 LBS | DIASTOLIC BLOOD PRESSURE: 87 MMHG | HEART RATE: 72 BPM | TEMPERATURE: 98 F

## 2023-07-14 DIAGNOSIS — Z00.00 WELL ADULT EXAM: ICD-10-CM

## 2023-07-14 DIAGNOSIS — Z79.01 ON ANTICOAGULANT THERAPY: ICD-10-CM

## 2023-07-14 DIAGNOSIS — Z00.00 WELL ADULT EXAM: Primary | ICD-10-CM

## 2023-07-14 DIAGNOSIS — Z85.828 HISTORY OF BASAL CELL CANCER: ICD-10-CM

## 2023-07-14 DIAGNOSIS — Z12.11 COLON CANCER SCREENING: ICD-10-CM

## 2023-07-14 LAB
ALBUMIN SERPL-MCNC: 4 G/DL (ref 3.4–5)
ALBUMIN/GLOB SERPL: 1.1 {RATIO} (ref 1–2)
ALP LIVER SERPL-CCNC: 56 U/L
ALT SERPL-CCNC: 36 U/L
ANION GAP SERPL CALC-SCNC: 6 MMOL/L (ref 0–18)
AST SERPL-CCNC: 24 U/L (ref 15–37)
BILIRUB SERPL-MCNC: 0.6 MG/DL (ref 0.1–2)
BILIRUB UR QL: NEGATIVE
BUN BLD-MCNC: 18 MG/DL (ref 7–18)
BUN/CREAT SERPL: 17.8 (ref 10–20)
CALCIUM BLD-MCNC: 9.4 MG/DL (ref 8.5–10.1)
CHLORIDE SERPL-SCNC: 107 MMOL/L (ref 98–112)
CHOLEST SERPL-MCNC: 215 MG/DL (ref ?–200)
CLARITY UR: CLEAR
CO2 SERPL-SCNC: 27 MMOL/L (ref 21–32)
CREAT BLD-MCNC: 1.01 MG/DL
DEPRECATED RDW RBC AUTO: 41.1 FL (ref 35.1–46.3)
ERYTHROCYTE [DISTWIDTH] IN BLOOD BY AUTOMATED COUNT: 11.6 % (ref 11–15)
EST. AVERAGE GLUCOSE BLD GHB EST-MCNC: 100 MG/DL (ref 68–126)
FASTING PATIENT LIPID ANSWER: YES
FASTING STATUS PATIENT QL REPORTED: YES
FOLATE SERPL-MCNC: 10.2 NG/ML (ref 8.7–?)
GFR SERPLBLD BASED ON 1.73 SQ M-ARVRAT: 89 ML/MIN/1.73M2 (ref 60–?)
GLOBULIN PLAS-MCNC: 3.7 G/DL (ref 2.8–4.4)
GLUCOSE BLD-MCNC: 101 MG/DL (ref 70–99)
GLUCOSE UR-MCNC: NORMAL MG/DL
HBA1C MFR BLD: 5.1 % (ref ?–5.7)
HCT VFR BLD AUTO: 43.3 %
HDLC SERPL-MCNC: 116 MG/DL (ref 40–59)
HGB BLD-MCNC: 14.6 G/DL
HGB UR QL STRIP.AUTO: NEGATIVE
KETONES UR-MCNC: NEGATIVE MG/DL
LDLC SERPL CALC-MCNC: 90 MG/DL (ref ?–100)
LEUKOCYTE ESTERASE UR QL STRIP.AUTO: NEGATIVE
MCH RBC QN AUTO: 32.7 PG (ref 26–34)
MCHC RBC AUTO-ENTMCNC: 33.7 G/DL (ref 31–37)
MCV RBC AUTO: 96.9 FL
NITRITE UR QL STRIP.AUTO: NEGATIVE
NONHDLC SERPL-MCNC: 99 MG/DL (ref ?–130)
OSMOLALITY SERPL CALC.SUM OF ELEC: 292 MOSM/KG (ref 275–295)
PH UR: 6.5 [PH] (ref 5–8)
PLATELET # BLD AUTO: 214 10(3)UL (ref 150–450)
POTASSIUM SERPL-SCNC: 4.4 MMOL/L (ref 3.5–5.1)
PROT SERPL-MCNC: 7.7 G/DL (ref 6.4–8.2)
PROT UR-MCNC: NEGATIVE MG/DL
RBC # BLD AUTO: 4.47 X10(6)UL
SODIUM SERPL-SCNC: 140 MMOL/L (ref 136–145)
SP GR UR STRIP: 1.02 (ref 1–1.03)
TRIGL SERPL-MCNC: 48 MG/DL (ref 30–149)
TSI SER-ACNC: 0.9 MIU/ML (ref 0.36–3.74)
UROBILINOGEN UR STRIP-ACNC: NORMAL
VIT B12 SERPL-MCNC: 381 PG/ML (ref 193–986)
VLDLC SERPL CALC-MCNC: 8 MG/DL (ref 0–30)
WBC # BLD AUTO: 5.7 X10(3) UL (ref 4–11)

## 2023-07-14 PROCEDURE — 84207 ASSAY OF VITAMIN B-6: CPT | Performed by: STUDENT IN AN ORGANIZED HEALTH CARE EDUCATION/TRAINING PROGRAM

## 2023-07-14 PROCEDURE — 3008F BODY MASS INDEX DOCD: CPT | Performed by: STUDENT IN AN ORGANIZED HEALTH CARE EDUCATION/TRAINING PROGRAM

## 2023-07-14 PROCEDURE — 99396 PREV VISIT EST AGE 40-64: CPT | Performed by: STUDENT IN AN ORGANIZED HEALTH CARE EDUCATION/TRAINING PROGRAM

## 2023-07-14 PROCEDURE — 3074F SYST BP LT 130 MM HG: CPT | Performed by: STUDENT IN AN ORGANIZED HEALTH CARE EDUCATION/TRAINING PROGRAM

## 2023-07-14 PROCEDURE — 84443 ASSAY THYROID STIM HORMONE: CPT

## 2023-07-14 PROCEDURE — 84425 ASSAY OF VITAMIN B-1: CPT

## 2023-07-14 PROCEDURE — 80053 COMPREHEN METABOLIC PANEL: CPT

## 2023-07-14 PROCEDURE — 80061 LIPID PANEL: CPT

## 2023-07-14 PROCEDURE — 36415 COLL VENOUS BLD VENIPUNCTURE: CPT

## 2023-07-14 PROCEDURE — 85027 COMPLETE CBC AUTOMATED: CPT

## 2023-07-14 PROCEDURE — 3079F DIAST BP 80-89 MM HG: CPT | Performed by: STUDENT IN AN ORGANIZED HEALTH CARE EDUCATION/TRAINING PROGRAM

## 2023-07-14 PROCEDURE — 83036 HEMOGLOBIN GLYCOSYLATED A1C: CPT

## 2023-07-14 PROCEDURE — 82746 ASSAY OF FOLIC ACID SERUM: CPT

## 2023-07-14 PROCEDURE — 82607 VITAMIN B-12: CPT

## 2023-07-18 LAB — VITAMIN B1 WHOLE BLD: 123 NMOL/L

## 2023-07-19 LAB — VITAMIN B6: 17.2 UG/L

## 2023-09-09 ENCOUNTER — TELEPHONE (OUTPATIENT)
Dept: FAMILY MEDICINE CLINIC | Facility: CLINIC | Age: 53
End: 2023-09-09

## 2023-09-11 DIAGNOSIS — F41.1 GENERALIZED ANXIETY DISORDER: ICD-10-CM

## 2023-09-11 DIAGNOSIS — L21.9 SEBORRHEIC DERMATITIS: ICD-10-CM

## 2023-09-11 RX ORDER — KETOCONAZOLE 20 MG/G
1 CREAM TOPICAL DAILY
Qty: 60 G | Refills: 2 | Status: SHIPPED | OUTPATIENT
Start: 2023-09-11

## 2023-09-11 RX ORDER — PROPRANOLOL HYDROCHLORIDE 10 MG/1
TABLET ORAL
Qty: 180 TABLET | Refills: 3 | Status: SHIPPED | OUTPATIENT
Start: 2023-09-11

## 2023-09-11 NOTE — TELEPHONE ENCOUNTER
Refill passed per [employer] protocol.     Requested Prescriptions   Pending Prescriptions Disp Refills    propranolol 10 MG Oral Tab 180 tablet 0     Sig: Take 1-2 tablets by mouth bid prn       Hypertensive Medications Protocol Passed - 9/11/2023  7:53 AM        Passed - In person appointment in the past 12 or next 3 months     Recent Outpatient Visits              1 month ago Well adult exam    Mira Rollins Delynn Race, MD    Office Visit    9 months ago History of basal cell cancer    Mira Rollins Delynn Race, MD    Office Visit    1 year ago History of DVT (deep vein thrombosis)    [address], [address], Seng Morgan MD    Office Visit    2 years ago Routine medical exam    [address], [address], Seng Morgan MD    Office Visit    2 years ago History of DVT (deep vein thrombosis)    [facility], [address], Delmar Paulino, Seng Sanchez MD    Telemedicine                      Passed - Last BP reading less than 140/90     BP Readings from Last 1 Encounters:  07/14/23 : 129/87              Passed - CMP or BMP in past 6 months     Recent Results (from the past 4392 hour(s))   COMP METABOLIC PANEL (14)    Collection Time: 07/14/23  1:45 PM   Result Value Ref Range    Glucose 101 (H) 70 - 99 mg/dL    Sodium 140 136 - 145 mmol/L    Potassium 4.4 3.5 - 5.1 mmol/L    Chloride 107 98 - 112 mmol/L    CO2 27.0 21.0 - 32.0 mmol/L    Anion Gap 6 0 - 18 mmol/L    BUN 18 7 - 18 mg/dL    Creatinine 1.01 0.70 - 1.30 mg/dL    BUN/CREA Ratio 17.8 10.0 - 20.0    Calcium, Total 9.4 8.5 - 10.1 mg/dL    Calculated Osmolality 292 275 - 295 mOsm/kg    eGFR-Cr 89 >=60 mL/min/1.73m2    ALT 36 16 - 61 U/L    AST 24 15 - 37 U/L    Alkaline Phosphatase 56 45 - 117 U/L    Bilirubin, Total 0.6 0.1 - 2.0 mg/dL Total Protein 7.7 6.4 - 8.2 g/dL    Albumin 4.0 3.4 - 5.0 g/dL    Globulin  3.7 2.8 - 4.4 g/dL    A/G Ratio 1.1 1.0 - 2.0    Patient Fasting for CMP? Yes      *Note: Due to a large number of results and/or encounters for the requested time period, some results have not been displayed. A complete set of results can be found in Results Review. Passed - In person appointment or virtual visit in the past 6 months     Recent Outpatient Visits              1 month ago Well adult exam    [facility] nAuj Clarke MD    Office Visit    9 months ago History of basal cell cancer    [address] [facility] Mira Clarke Lovenia Pier, MD    Office Visit    1 year ago History of DVT (deep vein thrombosis)    [address], Hitesh Noel MD    Office Visit    2 years ago Routine medical exam    [address], Hitesh Noel MD    Office Visit    2 years ago History of DVT (deep vein thrombosis)    [facility], [address], Delmar MesaHannah MD    Telemedicine                      Passed - [facility] or [facility] > 50     GFR Evaluation  EGFRCR: 89 , resulted on 7/14/2023            ketoconazole 2 % External Cream 60 g 2     Sig: Apply 1 Application  topically daily.  APPLY TO AFFECTED AREA       There is no refill protocol information for this order          Recent Outpatient Visits              1 month ago Well adult exam    19[address] Mira Briseno Lovenia Pier, MD    Office Visit    9 months ago History of basal cell cancer    [address] Mira Briseno Lovenia Pier, MD    Office Visit    1 year ago History of DVT (deep vein thrombosis)    [facility], [address], Cassandra Garcia MD [place]    Office Visit    2 years ago Routine medical exam    Carrillo Bishop MD    Office Visit    2 years ago History of DVT (deep vein thrombosis)    Nneka Knapp, [address] [place] [address] [facility], Gracy Vines MD    Telemedicine

## 2023-09-11 NOTE — TELEPHONE ENCOUNTER
Please review. Protocol failed / Has no protocol. Requested Prescriptions   Pending Prescriptions Disp Refills    ketoconazole 2 % External Cream 60 g 2     Sig: Apply 1 Application  topically daily.  APPLY TO AFFECTED AREA       There is no refill protocol information for this order      Signed Prescriptions Disp Refills    propranolol 10 MG Oral Tab 180 tablet 3     Sig: Take 1-2 tablets by mouth bid prn       Hypertensive Medications Protocol Passed - 9/11/2023  7:53 AM        Passed - In person appointment in the past 12 or next 3 months     Recent Outpatient Visits              1 month ago Well adult exam    Rosalba Donaldson MD    Office Visit    9 months ago History of basal cell cancer    Mira Donaldson Karan Lu, MD    Office Visit    1 year ago History of DVT (deep vein thrombosis)    [address], [address], Jose Morgan MD    Office Visit    2 years ago Routine medical exam    [address], [address] Татьяна Steven MD    Office Visit    2 years ago History of DVT (deep vein thrombosis)    [facility], [address], Delmar Paulino, Jose Garvey MD    Telemedicine                      Passed - Last BP reading less than 140/90     BP Readings from Last 1 Encounters:  07/14/23 : 129/87              Passed - CMP or BMP in past 6 months     Recent Results (from the past 4392 hour(s))   COMP METABOLIC PANEL (14)    Collection Time: 07/14/23  1:45 PM   Result Value Ref Range    Glucose 101 (H) 70 - 99 mg/dL    Sodium 140 136 - 145 mmol/L    Potassium 4.4 3.5 - 5.1 mmol/L    Chloride 107 98 - 112 mmol/L    CO2 27.0 21.0 - 32.0 mmol/L    Anion Gap 6 0 - 18 mmol/L    BUN 18 7 - 18 mg/dL    Creatinine 1.01 0.70 - 1.30 mg/dL    BUN/CREA Ratio 17.8 10.0 - 20.0    Calcium, Total 9.4 8.5 - 10.1 mg/dL    Calculated Osmolality 292 275 - 295 mOsm/kg    eGFR-Cr 89 >=60 mL/min/1.73m2    ALT 36 16 - 61 U/L    AST 24 15 - 37 U/L    Alkaline Phosphatase 56 45 - 117 U/L    Bilirubin, Total 0.6 0.1 - 2.0 mg/dL    Total Protein 7.7 6.4 - 8.2 g/dL    Albumin 4.0 3.4 - 5.0 g/dL    Globulin  3.7 2.8 - 4.4 g/dL    A/G Ratio 1.1 1.0 - 2.0    Patient Fasting for CMP? Yes      *Note: Due to a large number of results and/or encounters for the requested time period, some results have not been displayed. A complete set of results can be found in Results Review.                Passed - In person appointment or virtual visit in the past 6 months     Recent Outpatient Visits              1 month ago Well adult exam    Richard Craig MD    Office Visit    9 months ago History of basal cell cancer    [facility], Gabby Pham MD    Office Visit    1 year ago History of DVT (deep vein thrombosis)    [facility] [address] [place] [address] , Jenny Albarran MD    Office Visit    2 years ago Routine medical exam    Way[facility] Blamer, [address] Cristine Waters MD    Office Visit    2 years ago History of DVT (deep vein thrombosis)    [facility] [address] [place] [address] , Delmar Paulino, Zaki Parker MD    Telemedicine                      Passed - [facility] or GFRNAA > 50     GFR Evaluation  EGFRCR: 89 , resulted on 7/14/2023                Recent Outpatient Visits              1 month ago Well adult exam    Richard Craig MD    Office Visit    9 months ago History of basal cell cancer    [facility], Gabby Pham MD    Office Visit    1 year ago History of DVT (deep vein thrombosis)    [facility] Gaud[facility]io Paz [facility]    Office Visit    2 years ago Routine medical exam    [address], Gaudencio Paz MD    Office Visit    2 years ago History of DVT (deep vein thrombosis)    [facility], [facility], [place], Troy Gallardo MD    Telemedicine

## 2023-12-20 DIAGNOSIS — L21.9 SEBORRHEIC DERMATITIS: ICD-10-CM

## 2023-12-21 DIAGNOSIS — L21.9 SEBORRHEIC DERMATITIS: ICD-10-CM

## 2023-12-21 RX ORDER — KETOCONAZOLE 20 MG/G
1 CREAM TOPICAL DAILY
Qty: 60 G | Refills: 2 | Status: SHIPPED | OUTPATIENT
Start: 2023-12-21

## 2023-12-21 NOTE — TELEPHONE ENCOUNTER
Please review; protocol failed.    Requested Prescriptions   Pending Prescriptions Disp Refills    KETOCONAZOLE 2 % External Cream [Pharmacy Med Name: KETOCONAZOLE 2% CREAM 60GM] 60 g 2     Sig: APPLY TOPICALLY TO THE AFFECTED AREA DAILY       There is no refill protocol information for this order        Recent Outpatient Visits              5 months ago Well adult exam    [address], [address] [facility], Vee Denis MD    Office Visit    1 year ago History of basal cell cancer    [facility], Vivian Saeed MD    Office Visit    1 year ago History of DVT (deep vein thrombosis)    [address], [address], [place]    Office Visit    2 years ago Routine medical exam    [address], Hilda Huerta MD    Office Visit    3 years ago History of DVT (deep vein thrombosis)    [address], [address], [place], Luci Daugherty MD    Telemedicine

## 2023-12-22 RX ORDER — KETOCONAZOLE 20 MG/G
1 CREAM TOPICAL DAILY
Qty: 60 G | Refills: 2 | OUTPATIENT
Start: 2023-12-22

## 2024-04-11 DIAGNOSIS — L21.9 SEBORRHEIC DERMATITIS: ICD-10-CM

## 2024-04-11 RX ORDER — KETOCONAZOLE 20 MG/G
1 CREAM TOPICAL DAILY
Qty: 60 G | Refills: 2 | Status: SHIPPED | OUTPATIENT
Start: 2024-04-11

## 2024-04-11 NOTE — TELEPHONE ENCOUNTER
Please review; protocol failed/No Protocol    LOV: 07/14/2023    Requested Prescriptions   Pending Prescriptions Disp Refills    ketoconazole 2 % External Cream 60 g 2     Sig: Apply 1 Application topically daily.       There is no refill protocol information for this order          Recent Outpatient Visits              9 months ago Well adult exam    [facility] Karen Marie, MD    Office Visit    1 year ago History of basal cell cancer    [facility]    Office Visit    2 years ago History of DVT (deep vein thrombosis)    [facility], [place] Joe Michelle E, MD    Office Visit    2 years ago Routine medical exam    [facility], [place] Joe Michelle E, MD    Office Visit    3 years ago History of DVT (deep vein thrombosis)    [facility], [place] Joe Michelle E, MD    Telemedicine

## 2024-05-20 DIAGNOSIS — L21.9 SEBORRHEIC DERMATITIS: ICD-10-CM

## 2024-05-22 RX ORDER — KETOCONAZOLE 20 MG/G
1 CREAM TOPICAL DAILY
Qty: 60 G | Refills: 2 | OUTPATIENT
Start: 2024-05-22

## 2024-06-03 DIAGNOSIS — F41.1 GENERALIZED ANXIETY DISORDER: ICD-10-CM

## 2024-06-04 ENCOUNTER — TELEPHONE (OUTPATIENT)
Dept: FAMILY MEDICINE CLINIC | Facility: CLINIC | Age: 54
End: 2024-06-04

## 2024-06-06 RX ORDER — PROPRANOLOL HYDROCHLORIDE 10 MG/1
TABLET ORAL
Qty: 180 TABLET | Refills: 3 | Status: SHIPPED | OUTPATIENT
Start: 2024-06-06

## 2024-06-06 NOTE — TELEPHONE ENCOUNTER
REFILL PASSED PER [facility] PROTOCOLS    Requested Prescriptions   Pending Prescriptions Disp Refills    PROPRANOLOL 10 MG Oral Tab [Pharmacy Med Name: PROPRANOLOL 10MG TABLETS] 180 tablet 3     Sig: TAKE 1 TO 2 TABLETS BY MOUTH TWICE DAILY AS NEEDED       Hypertension Medications Protocol Passed - 6/3/2024 11:14 AM        Passed - CMP or BMP in past 12 months        Passed - Last BP reading less than 140/90     BP Readings from Last 1 Encounters:   07/14/23 129/87               Passed - In person appointment or virtual visit in the past 12 mos or appointment in next 3 mos     Recent Outpatient Visits              10 months ago Well adult exam    [facility], [facility] Sloan Sandoval Karen Marie, MD    Office Visit    1 year ago History of basal cell cancer    [facility], [facility]    Office Visit    2 years ago History of DVT (deep vein thrombosis)    [facility], [place] Joe Michelle E, MD    Office Visit    2 years ago Routine medical exam    [facility] [place] Michelle E, MD    Office Visit    3 years ago History of DVT (deep vein thrombosis)    [facility], [place] Joe Michelle E, MD    Telemedicine                      Passed - EGFRCR or GFRNAA > 50     GFR Evaluation  EGFRCR: 89 , resulted on 7/14/2023                 Recent Outpatient Visits              10 months ago Well adult exam    [facility] Karen Marie, MD    Office Visit    1 year ago History of basal cell cancer    [facility]    Office Visit    2 years ago History of DVT (deep vein thrombosis)    [facility] [place] Michelle E, MD     Office Visit    2 years ago Routine medical exam    [facility], [place] Joe Michelle E, MD    Office Visit    3 years ago History of DVT (deep vein thrombosis)    [facility], [place] Joe Michelle E, MD    Telemedicine

## 2024-10-28 DIAGNOSIS — F41.1 GENERALIZED ANXIETY DISORDER: ICD-10-CM

## 2024-10-30 RX ORDER — PROPRANOLOL HYDROCHLORIDE 10 MG/1
TABLET ORAL
Qty: 180 TABLET | Refills: 3 | OUTPATIENT
Start: 2024-10-30

## 2024-11-11 ENCOUNTER — TELEPHONE (OUTPATIENT)
Dept: FAMILY MEDICINE CLINIC | Facility: CLINIC | Age: 54
End: 2024-11-11

## 2024-11-11 NOTE — TELEPHONE ENCOUNTER
Dr. Muñoz: patient is anticipating getting a tooth extraction.  He is not scheduled yet. He would like to know if he needs to stop Eliquis 5mg before he gets tooth pulled?    (Patient is going to  today; might not answer phone. Send mychart with provider's response. )

## 2024-12-02 DIAGNOSIS — L21.9 SEBORRHEIC DERMATITIS: ICD-10-CM

## 2024-12-04 RX ORDER — BETAMETHASONE DIPROPIONATE 0.5 MG/G
LOTION TOPICAL
Qty: 60 ML | Refills: 1 | Status: SHIPPED | OUTPATIENT
Start: 2024-12-04

## 2024-12-04 RX ORDER — KETOCONAZOLE 20 MG/G
1 CREAM TOPICAL DAILY
Qty: 60 G | Refills: 2 | Status: SHIPPED | OUTPATIENT
Start: 2024-12-04

## 2024-12-04 NOTE — TELEPHONE ENCOUNTER
Please review. Protocol Failed; No Protocol    Requested Prescriptions   Pending Prescriptions Disp Refills    betamethasone dipropionate 0.05 % External Lotion 60 mL 1     Si application to AA of face qd prn       There is no refill protocol information for this order       ketoconazole 2 % External Cream 60 g 2     Sig: Apply 1 Application topically daily.       There is no refill protocol information for this order            Future Appointments         Provider Department Appt Notes    Tomorrow Jennifer Carranza APRN [facility], [place], [place] Annual Physical & Prescriptions.          Recent Outpatient Visits              1 year ago Well adult exam    [facility]    Office Visit    1 year ago History of basal cell cancer    [facility]    Office Visit    2 years ago History of DVT (deep vein thrombosis)    [facility] [place] Michelle E, MD    Office Visit    3 years ago Routine medical exam    [facility] [place] Michelle E, MD    Office Visit    4 years ago History of DVT (deep vein thrombosis)    [facility] [place] Michelle E, MD    Telemedicine

## 2025-01-10 ENCOUNTER — HOSPITAL ENCOUNTER (OUTPATIENT)
Age: 55
Discharge: HOME | End: 2025-01-10
Payer: COMMERCIAL

## 2025-01-10 ENCOUNTER — APPOINTMENT (OUTPATIENT)
Dept: GENERAL RADIOLOGY | Age: 55
End: 2025-01-10
Attending: PHYSICIAN ASSISTANT
Payer: COMMERCIAL

## 2025-01-10 VITALS
OXYGEN SATURATION: 96 % | DIASTOLIC BLOOD PRESSURE: 95 MMHG | SYSTOLIC BLOOD PRESSURE: 139 MMHG | HEART RATE: 89 BPM | TEMPERATURE: 99 F | RESPIRATION RATE: 16 BRPM

## 2025-01-10 DIAGNOSIS — R03.0 ELEVATED BLOOD PRESSURE READING: ICD-10-CM

## 2025-01-10 DIAGNOSIS — J18.9 COMMUNITY ACQUIRED PNEUMONIA, UNSPECIFIED LATERALITY: Primary | ICD-10-CM

## 2025-01-10 DIAGNOSIS — R93.89 ABNORMAL CHEST X-RAY: ICD-10-CM

## 2025-01-10 DIAGNOSIS — R05.1 ACUTE COUGH: ICD-10-CM

## 2025-01-10 LAB
POCT INFLUENZA A: NEGATIVE
POCT INFLUENZA B: NEGATIVE

## 2025-01-10 PROCEDURE — 71046 X-RAY EXAM CHEST 2 VIEWS: CPT | Performed by: PHYSICIAN ASSISTANT

## 2025-01-10 PROCEDURE — 99204 OFFICE O/P NEW MOD 45 MIN: CPT | Performed by: PHYSICIAN ASSISTANT

## 2025-01-10 PROCEDURE — 87502 INFLUENZA DNA AMP PROBE: CPT | Performed by: PHYSICIAN ASSISTANT

## 2025-01-10 RX ORDER — BENZONATATE 100 MG/1
100 CAPSULE ORAL 3 TIMES DAILY PRN
Qty: 30 CAPSULE | Refills: 0 | Status: SHIPPED | OUTPATIENT
Start: 2025-01-10 | End: 2025-02-09

## 2025-01-10 RX ORDER — DOXYCYCLINE 100 MG/1
100 CAPSULE ORAL 2 TIMES DAILY
Qty: 10 CAPSULE | Refills: 0 | Status: SHIPPED | OUTPATIENT
Start: 2025-01-10 | End: 2025-01-15

## 2025-01-10 RX ORDER — ALBUTEROL SULFATE 90 UG/1
2 INHALANT RESPIRATORY (INHALATION) EVERY 4 HOURS PRN
Qty: 1 EACH | Refills: 0 | Status: SHIPPED | OUTPATIENT
Start: 2025-01-10 | End: 2025-02-09

## 2025-01-10 NOTE — ED PROVIDER NOTES
Patient Seen in: [facility] [place]    History     Chief Complaint   Patient presents with    Cough/URI     Stated Complaint: Cold - Head and Chest Cold going on day 6    HPI    Dre Dobbins is a 55 year old male presents with chief complaint of cough.  Onset 6 days ago.  Patient reports associated nasal congestion.  Patient denies fever, chills, earache, sore throat, abdominal pain, nausea, vomiting, diarrhea, constipation, dysuria, hematuria, flank pain, rash, neck pain, neck swelling, restricted neck movement, dyspnea, wheeze, hemoptysis.      Past Medical History:    DVT (deep venous thrombosis) (HCC)    Skin cancer       History reviewed. No pertinent surgical history.         No family history on file.    Social History     Socioeconomic History    Marital status:    Tobacco Use    Smoking status: Former     Current packs/day: 0.00     Types: Cigarettes     Quit date:      Years since quittin.0     Passive exposure: Never    Smokeless tobacco: Former     Types: Chew     Quit date: 10/21/2020    Tobacco comments:     daily chewer   Vaping Use    Vaping status: Never Used   Substance and Sexual Activity    Alcohol use: Yes     Comment: socially    Drug use: No       Review of Systems    Positive for stated complaint: Cold - Head and Chest Cold going on day 6  Other systems are as noted in HPI.  Constitutional and vital signs reviewed.      All other systems reviewed and negative except as noted above.    PSFH elements reviewed from today and agreed except as otherwise stated in HPI.    Physical Exam     ED Triage Vitals [01/10/25 0859]   BP (!) 139/95   Pulse 89   Resp 16   Temp 98.6 °F (37 °C)   Temp src Oral   SpO2 96 %   O2 Device None (Room air)       Current:BP (!) 139/95   Pulse 89   Temp 98.6 °F (37 °C) (Oral)   Resp 16   SpO2 96%     PULSE OX within normal limits on room air as interpreted by this provider.     Constitutional: The patient is cooperative. Appears  well-developed and well-nourished.  No acute distress.  Psychological: Alert, No abnormalities of mood, affect.  Head: Normocephalic/atraumatic.   Eyes: Pupils are equal round reactive to light.  Conjunctiva are within normal limits.  ENT: Pharynx noninjected.  Tonsils within normal size limits bilaterally.  No tonsillar exudates.  TMs within normal limits bilaterally.  Mucous membranes moist.  Neck: The neck is supple.  No meningeal signs.  Chest: There is no tenderness to the chest wall.  No CVA tenderness bilaterally.  Respiratory: Respiratory effort was normal.  Decreased breath sounds bilaterally.  There is no stridor.  Air entry is equal.  Cardiovascular: Regular rate and rhythm.  Capillary refill is brisk.    Lymphatic: No gross lymphadenopathy noted.  Musculoskeletal: Musculoskeletal system is grossly intact.  There is no obvious deformity.  Neurological: Gross motor movement is intact in all 4 extremities.  Patient exhibits normal speech.  Skin: Skin is normal to inspection.  Warm and dry.  No obvious bruising.  No obvious rash.        ED Course     Labs Reviewed   POCT FLU TEST - Normal    Narrative:     This assay is a rapid molecular in vitro test utilizing nucleic acid amplification of influenza A and B viral RNA.       MDM     Differential diagnosis including but not limited to URI, bronchitis, pneumonia, sinusitis    Radiology findings: XR CHEST PA + LAT CHEST (CPT=71046)    Result Date: 1/10/2025  CONCLUSION:   Patchy left lower lobe opacity, concerning for pneumonia.  Differential would be subsegmental atelectasis.  There is a somewhat nodular finding within this region, which may relate to the aforementioned process or may represent a 7 mm pulmonary nodule.  Recommend follow-up chest radiographs in 4 to 6 weeks for reassessment.    Dictated by (CST): Gudelia Christianson MD on 1/10/2025 at 9:34 AM     Finalized by (CST): Gudelia Christianson MD on 1/10/2025 at 9:35 AM           Chest x-ray images  independently reviewed by this provider-patchy opacity left lung.    Physical exam remained stable over serial reexaminations as previously documented.  Results reviewed with patient.    I have given the patient instructions regarding their diagnoses, expectations, follow up, and ER precautions. I explained to the patient that emergent conditions may arise and to go to the ER for new, worsening or any persistent conditions. I've explained the importance of following up with their doctor as instructed. The patient verbalized understanding of the discharge instructions and plan.    The patient was informed of their elevated blood pressure reading at immediate care.  They were informed of the dangers of undiagnosed and untreated hypertension.  Education regarding lifestyle modifications and the need for appropriate follow-up with their PCP to have their blood pressure re-checked within 24-48 hours was provided.     Disposition and Plan     Clinical Impression:  1. Community acquired pneumonia, unspecified laterality    2. Acute cough    3. Abnormal chest x-ray    4. Elevated blood pressure reading        Disposition:  Discharge    Follow-up:  Donna Muñoz MD  [address] [place], [place] [id number] [phone]    Call in 1 day  For follow-up      Medications Prescribed:  Current Discharge Medication List        START taking these medications    Details   albuterol 108 (90 Base) MCG/ACT Inhalation Aero Soln Inhale 2 puffs into the lungs every 4 (four) hours as needed for Wheezing.  Qty: 1 each, Refills: 0      Spacer/Aero-Holding Chambers Does not apply Device Use with albuterol inhaler  Qty: 1 each, Refills: 0      benzonatate 100 MG Oral Cap Take 1 capsule (100 mg total) by mouth 3 (three) times daily as needed for cough.  Qty: 30 capsule, Refills: 0      amoxicillin clavulanate 875-125 MG Oral Tab Take 1 tablet by mouth 2 (two) times daily for 5 days.  Qty: 10 tablet, Refills: 0      doxycycline 100 MG Oral Cap  Take 1 capsule (100 mg total) by mouth 2 (two) times daily for 5 days.  Qty: 10 capsule, Refills: 0

## 2025-01-10 NOTE — ED INITIAL ASSESSMENT (HPI)
For 6 days now, Covid - 3 days ago. States that he continues to have a tickle that causes him to cough, and head congestion.

## 2025-01-27 ENCOUNTER — HOSPITAL ENCOUNTER (OUTPATIENT)
Age: 55
Discharge: HOME | End: 2025-01-27
Payer: COMMERCIAL

## 2025-01-27 VITALS
SYSTOLIC BLOOD PRESSURE: 147 MMHG | DIASTOLIC BLOOD PRESSURE: 92 MMHG | RESPIRATION RATE: 16 BRPM | OXYGEN SATURATION: 98 % | HEART RATE: 71 BPM | TEMPERATURE: 98 F

## 2025-01-27 DIAGNOSIS — J01.40 ACUTE NON-RECURRENT PANSINUSITIS: Primary | ICD-10-CM

## 2025-01-27 PROCEDURE — 99213 OFFICE O/P EST LOW 20 MIN: CPT | Performed by: NURSE PRACTITIONER

## 2025-01-27 RX ORDER — FLUTICASONE PROPIONATE 50 MCG
2 SPRAY, SUSPENSION (ML) NASAL 2 TIMES DAILY
Qty: 16 G | Refills: 0 | Status: SHIPPED | OUTPATIENT
Start: 2025-01-27

## 2025-01-27 RX ORDER — CEFPODOXIME PROXETIL 200 MG/1
200 TABLET, FILM COATED ORAL 2 TIMES DAILY
Qty: 14 TABLET | Refills: 0 | Status: SHIPPED | OUTPATIENT
Start: 2025-01-27 | End: 2025-02-03

## 2025-01-27 NOTE — ED PROVIDER NOTES
Patient Seen in: [facility]      History     Chief Complaint   Patient presents with    Sinus Problem     Sinus Infection is lingering. - Entered by patient     Stated Complaint: Sinus Problem - Sinus Infection is lingering.    Subjective:   HPI    55-year-old male here for evaluation of sinus pain pressure congestion, headache and green mucus that started getting worse on Wednesday.  He has a minimal cough but no shortness of breath.  Patient reports having pneumonia on 1/10/2025, finishing full course of antibiotics that were given and feeling better up until last week when he felt worse again with his sinuses.  He has not had a sinus infection in over 10 years but has been using over-the-counter medicine with no relief.  Patient is on Eliquis daily as well.      Objective:     Past Medical History:    DVT (deep venous thrombosis) (HCC)    Skin cancer              History reviewed. No pertinent surgical history.             Social History     Socioeconomic History    Marital status:    Tobacco Use    Smoking status: Former     Current packs/day: 0.00     Types: Cigarettes     Quit date:      Years since quittin.0     Passive exposure: Never    Smokeless tobacco: Former     Types: Chew     Quit date: 10/21/2020    Tobacco comments:     daily chewer   Vaping Use    Vaping status: Never Used   Substance and Sexual Activity    Alcohol use: Yes     Comment: socially    Drug use: No              Review of Systems    Positive for stated complaint: Sinus Problem - Sinus Infection is lingering.  Other systems are as noted in HPI.  Constitutional and vital signs reviewed.      All other systems reviewed and negative except as noted above.    Physical Exam     ED Triage Vitals [25 0902]   BP (!) 147/92   Pulse 71   Resp 16   Temp 98 °F (36.7 °C)   Temp src Oral   SpO2 98 %   O2 Device None (Room air)       Current Vitals:   Vital Signs  BP: (!) 147/92  Pulse: 71  Resp: 16  Temp: 98 °F  (36.7 °C)  Temp src: Oral    Oxygen Therapy  SpO2: 98 %  O2 Device: None (Room air)        Physical Exam  Vitals and nursing note reviewed.   Constitutional:       General: He is not in acute distress.     Appearance: Normal appearance. He is not ill-appearing, toxic-appearing or diaphoretic.   HENT:      Head: Normocephalic. No raccoon eyes, Bradford's sign, right periorbital erythema or left periorbital erythema.      Jaw: There is normal jaw occlusion.      Right Ear: Ear canal and external ear normal. A middle ear effusion is present. Tympanic membrane is not erythematous or retracted.      Left Ear: Ear canal and external ear normal. A middle ear effusion is present. Tympanic membrane is not erythematous or retracted.      Nose: Congestion and rhinorrhea present. Rhinorrhea is purulent.      Right Sinus: No maxillary sinus tenderness or frontal sinus tenderness.      Left Sinus: Maxillary sinus tenderness and frontal sinus tenderness present.      Mouth/Throat:      Lips: Pink.      Mouth: Mucous membranes are moist.      Pharynx: Oropharynx is clear. Uvula midline. Postnasal drip present. No pharyngeal swelling, oropharyngeal exudate, posterior oropharyngeal erythema or uvula swelling.      Tonsils: No tonsillar exudate or tonsillar abscesses.   Eyes:      General:         Right eye: No discharge.         Left eye: No discharge.      Extraocular Movements: Extraocular movements intact.      Conjunctiva/sclera: Conjunctivae normal.      Pupils: Pupils are equal, round, and reactive to light.   Cardiovascular:      Rate and Rhythm: Normal rate.      Pulses: Normal pulses.   Pulmonary:      Effort: Pulmonary effort is normal. No tachypnea, prolonged expiration, respiratory distress or retractions.      Breath sounds: Normal breath sounds and air entry. No stridor. No wheezing, rhonchi or rales.   Musculoskeletal:      Cervical back: Normal range of motion and neck supple. No tenderness.   Lymphadenopathy:       Cervical: No cervical adenopathy.   Skin:     General: Skin is warm.      Findings: No rash.   Neurological:      Mental Status: He is alert and oriented to person, place, and time.   Psychiatric:         Mood and Affect: Mood normal.         Behavior: Behavior normal.           ED Course   Labs Reviewed - No data to display              MDM     55-year-old male here for evaluation of sinus pain pressure and congestion that started getting worse on Wednesday, reports green mucus and minimal dry cough.  Patient has history of pneumonia on 1/10/2025 finish full course of antibiotics.    On exam patient very congested sounding.  Lungs are clear with no wheezing.  Good air movement, 98% on room air.  Bilateral TM with effusions but no erythema or infection.  Pharynx clear with no erythema swelling or exudate.  He does have postnasal drip posteriorly.  He has tenderness on exam to left greater than right maxillary and frontal sinuses.    Differential diagnoses reflecting the complexity of care include but are not limited to Viral URI w congestion, viral sinusitis, bacterial sinusitis.    Comorbidities that add complexity to management include: on Eliquis for DVT, recent pneumonia  History obtained by an independent source was from: patient  My independent interpretations of studies include: none  Shared decision making was done by: patient, myself  Discussions of management was done with: patient    Patient is fatigued, non-toxic and in no acute distress.  Vital signs are stable.   Discussed due to recent abx use for pneumonia, will place on another covering abx for sinusitis, Cefpodoxime x 7 days. Will avoid resp fluoroquinolones today.. PT agrees with flonase BID for inflammation, headache, Claritin at home to continue, PO fluids, humidifier, good nose blowing.    All questions answered. Return and ER precautions given.    Counseled: Patient, regarding diagnosis, regarding treatment plan, regarding diagnostic results,  regarding prescription, I have discussed with the patient the results of tests, differential diagnosis, and warning signs and symptoms that should prompt immediate return. The patient understands these instructions and agrees to the follow-up plan provided. There is no barriers to learning. Appropriate f/u given. Patient agrees to return for any concerns/ problems/complications.            Medical Decision Making      Disposition and Plan     Clinical Impression:  1. Acute non-recurrent pansinusitis         Disposition:  Discharge  1/27/2025  9:44 am    Follow-up:  Donna Muñoz MD  [address] [place] [id number]  [phone]    Schedule an appointment as soon as possible for a visit in 2 weeks  If symptoms worsen          Medications Prescribed:  Discharge Medication List as of 1/27/2025  9:39 AM              Supplementary Documentation:

## 2025-01-27 NOTE — DISCHARGE INSTRUCTIONS
Follow up with your doctor as needed OR ENT given if symptoms persist > 10 days despite therapy given.    Use flonase nasal spray to each nostril once or twice daily to help with sinus drainage, congestion and headaches.  Use Claritin or Zyrtec daily to help with itchy, watery eyes, runny nose and drainage.  Take tylenol for sinus pain, fever or chills, every 6 hours if needed    Take antibiotic as directed for 7 days. Finish full course. Symptoms usually start to improve after 3 days of medication and usually resolve within 10 days of starting antibiotic.    RETURN or GO TO ED for fever > 102, vomiting, dizziness, chest pain, shortness of breath, difficulty swallowing

## 2025-06-16 ENCOUNTER — TELEPHONE (OUTPATIENT)
Dept: INTERNAL MEDICINE CLINIC | Facility: CLINIC | Age: 55
End: 2025-06-16

## 2025-06-16 NOTE — TELEPHONE ENCOUNTER
[facility] will [facility]ally be out of service 6-17-25 if needed accomodation to get to the second floor please add to appointment note \"needs 1st floor\" or if wanted to reschedule/do video visit can as well. Unless can use the stairs no need for further actions.

## 2025-06-17 ENCOUNTER — OFFICE VISIT (OUTPATIENT)
Dept: INTERNAL MEDICINE CLINIC | Facility: CLINIC | Age: 55
End: 2025-06-17
Payer: COMMERCIAL

## 2025-06-17 VITALS
WEIGHT: 189.19 LBS | DIASTOLIC BLOOD PRESSURE: 84 MMHG | HEIGHT: 70 IN | BODY MASS INDEX: 27.09 KG/M2 | OXYGEN SATURATION: 96 % | TEMPERATURE: 97 F | HEART RATE: 77 BPM | RESPIRATION RATE: 18 BRPM | SYSTOLIC BLOOD PRESSURE: 138 MMHG

## 2025-06-17 DIAGNOSIS — F41.1 GENERALIZED ANXIETY DISORDER: ICD-10-CM

## 2025-06-17 DIAGNOSIS — L21.9 SEBORRHEIC DERMATITIS: ICD-10-CM

## 2025-06-17 DIAGNOSIS — Z86.718 HISTORY OF DVT (DEEP VEIN THROMBOSIS): ICD-10-CM

## 2025-06-17 PROCEDURE — 3008F BODY MASS INDEX DOCD: CPT | Performed by: STUDENT IN AN ORGANIZED HEALTH CARE EDUCATION/TRAINING PROGRAM

## 2025-06-17 PROCEDURE — 3079F DIAST BP 80-89 MM HG: CPT | Performed by: STUDENT IN AN ORGANIZED HEALTH CARE EDUCATION/TRAINING PROGRAM

## 2025-06-17 PROCEDURE — 99214 OFFICE O/P EST MOD 30 MIN: CPT | Performed by: STUDENT IN AN ORGANIZED HEALTH CARE EDUCATION/TRAINING PROGRAM

## 2025-06-17 PROCEDURE — 3075F SYST BP GE 130 - 139MM HG: CPT | Performed by: STUDENT IN AN ORGANIZED HEALTH CARE EDUCATION/TRAINING PROGRAM

## 2025-06-17 RX ORDER — BETAMETHASONE DIPROPIONATE 0.5 MG/G
LOTION TOPICAL
Qty: 60 ML | Refills: 1 | Status: CANCELLED | OUTPATIENT
Start: 2025-06-17

## 2025-06-17 RX ORDER — PROPRANOLOL HYDROCHLORIDE 10 MG/1
TABLET ORAL
Qty: 60 TABLET | Refills: 1 | Status: SHIPPED | OUTPATIENT
Start: 2025-06-17

## 2025-06-17 NOTE — PROGRESS NOTES
The following individual(s) verbally consented to be recorded using ambient AI listening technology and understand that they can each withdraw their consent to this listening technology at any point by asking the clinician to turn off or pause the recording:YES    Patient name: Dre Dobbins  Additional names:

## 2025-06-17 NOTE — PROGRESS NOTES
Subjective     Chief Complaint   Patient presents with    Medication Follow-Up     Also wants to discuss blood clots in left leg       History of Present Illness  Dre Dobbins is a 55 year old male with recurrent blood clots who presents for prescription refills and management of a suspected blood clot in the right leg.    He experiences symptoms consistent with a blood clot in his right leg, which he describes as a familiar feeling due to his history of three previous blood clots. It seems like patient had reduced his Eliquis dosage from  (5 mg twice a day) to (2.5 mg twice a day) in January to save on costs, based on information he read suggesting a lower dose after 26 months. However, he began experiencing symptoms of swelling in his leg again and increased his dosage back to 10mg BID since last Wednesday, which he reports has improved his symptoms.    He has been on Eliquis since 2018 for recurrent blood clots, with two clots in one leg and one in the other.     He also requests a refill for propranolol, which he uses for anxiety related to public speaking, as prescribed by his previous doctor. He typically uses it as needed and requests a supply of 60 pills with a refill.    He mentions using betamethasone lotion for dry skin in his mustache area but does not need a refill at this time. He has started seeing a dermatologist for ketoconazole treatment.    Results        Past Medical History[1]    Past Surgical History[2]    Allergies[3]    Family History[4]    Social Hx on file[5]      I have personally reviewed and updated the following EMR sections as appropriate: Current medications, Allergies, Problem list, Past Medical History, Past Surgical History, Social History and Family History    Review of Systems   Constitutional: Negative.    Respiratory: Negative.     Cardiovascular: Negative.    Gastrointestinal: Negative.    Skin: Negative.    Neurological: Negative.        Objective     Medications Ordered  Prior to Encounter[6]  /84 (BP Location: Right arm, Patient Position: Sitting, Cuff Size: adult)   Pulse 77   Temp 97.1 °F (36.2 °C) (Temporal)   Resp 18   Ht 5' 10\" (1.778 m)   Wt 189 lb 3.2 oz (85.8 kg)   SpO2 96%   BMI 27.15 kg/m²   Physical Exam  Vitals reviewed.   Constitutional:       Appearance: Normal appearance.   HENT:      Head: Normocephalic and atraumatic.   Skin:     General: Skin is warm and dry.   Neurological:      General: No focal deficit present.      Mental Status: He is alert and oriented to person, place, and time.   Psychiatric:         Mood and Affect: Mood normal.         Behavior: Behavior normal.         No results found for this or any previous visit (from the past 14 weeks).    Assessment and Plan      Generalized anxiety disorder  -     Propranolol HCl; TAKE 1 TO 2 TABLETS BY MOUTH TWICE DAILY AS NEEDED  Dispense: 60 tablet; Refill: 1  Seborrheic dermatitis  History of DVT (deep vein thrombosis)  -     Apixaban; Take 1 tablet (5 mg total) by mouth 2 (two) times daily.  Dispense: 180 tablet; Refill: 3      Assessment & Plan  Recurrent Deep Vein Thrombosis (DVT)  Recurrent DVTs necessitate indefinite full anticoagulation.  - Prescribe Eliquis 5 mg oral twice a day, 180 tablets with three refills.  - recommended doppler to check DVT. Patient declined.     Public Speaking Anxiety  Propranolol effective for anxiety management during public speaking.  - Prescribe propranolol with 60 tablets and a refill.    General Health Maintenance  Annual physical examination and preventive screenings are due.  - Schedule an annual physical examination, including labs, vaccines, colonoscopy, and PSA screening.       No follow-ups on file.    David Phillips MD  Internal Medicine  6/17/2025       [1]   Past Medical History:   DVT (deep venous thrombosis) (HCC)    Skin cancer   [2] History reviewed. No pertinent surgical history.  [3] No Known Allergies  [4] History reviewed. No pertinent family  history.  [5]   Social History  Socioeconomic History    Marital status:    Tobacco Use    Smoking status: Former     Current packs/day: 0.00     Types: Cigarettes     Quit date:      Years since quittin.4     Passive exposure: Never    Smokeless tobacco: Former     Types: Chew     Quit date: 10/21/2020    Tobacco comments:     daily chewer   Vaping Use    Vaping status: Never Used   Substance and Sexual Activity    Alcohol use: Yes     Comment: socially    Drug use: No   [6]   Current Outpatient Medications on File Prior to Visit   Medication Sig Dispense Refill    fluticasone propionate 50 MCG/ACT Nasal Suspension 2 sprays by Nasal route in the morning and 2 sprays before bedtime. 16 g 0    betamethasone dipropionate 0.05 % External Lotion 1 application to AA of face qd prn 60 mL 1    ketoconazole 2 % External Cream Apply 1 Application topically daily. 60 g 2    Spacer/Aero-Holding Chambers Does not apply Device Use with albuterol inhaler (Patient not taking: Reported on 2025) 1 each 0     No current facility-administered medications on file prior to visit.

## 2025-07-14 ENCOUNTER — HOSPITAL ENCOUNTER (OUTPATIENT)
Dept: ULTRASOUND IMAGING | Age: 55
Discharge: HOME | End: 2025-07-14
Attending: STUDENT IN AN ORGANIZED HEALTH CARE EDUCATION/TRAINING PROGRAM
Payer: COMMERCIAL

## 2025-07-14 DIAGNOSIS — M79.89 RIGHT LEG SWELLING: ICD-10-CM

## 2025-07-14 PROCEDURE — 93971 EXTREMITY STUDY: CPT | Performed by: STUDENT IN AN ORGANIZED HEALTH CARE EDUCATION/TRAINING PROGRAM

## 2025-07-22 ENCOUNTER — OFFICE VISIT (OUTPATIENT)
Dept: INTERNAL MEDICINE CLINIC | Facility: CLINIC | Age: 55
End: 2025-07-22
Payer: COMMERCIAL

## 2025-07-22 VITALS
WEIGHT: 187 LBS | HEART RATE: 68 BPM | DIASTOLIC BLOOD PRESSURE: 80 MMHG | BODY MASS INDEX: 26.77 KG/M2 | SYSTOLIC BLOOD PRESSURE: 130 MMHG | OXYGEN SATURATION: 98 % | HEIGHT: 70 IN

## 2025-07-22 DIAGNOSIS — M71.21 BAKER CYST, RIGHT: Primary | ICD-10-CM

## 2025-07-22 PROCEDURE — 3079F DIAST BP 80-89 MM HG: CPT | Performed by: INTERNAL MEDICINE

## 2025-07-22 PROCEDURE — 3075F SYST BP GE 130 - 139MM HG: CPT | Performed by: INTERNAL MEDICINE

## 2025-07-22 PROCEDURE — 99214 OFFICE O/P EST MOD 30 MIN: CPT | Performed by: INTERNAL MEDICINE

## 2025-07-22 PROCEDURE — 3008F BODY MASS INDEX DOCD: CPT | Performed by: INTERNAL MEDICINE

## 2025-07-22 NOTE — PROGRESS NOTES
Subjective:     Patient ID: Dre Dobbins is a 55 year old male.    Leg Pain       He came today for follow-up right leg pain.  He does have a history of recurrent DVT and he was on Eliquis but since January he cat down on the dose of Eliquis  He was having pain on on his right calf muscle so he was seen by another provider who ordered ultrasound venous Doppler which was negative for DVT but showed Baker's cyst  He states that he went back up on the dose of Eliquis.  His pain is better now.  He is able to bend his knee  History/Other:   Review of Systems   Constitutional: Negative.    HENT: Negative.     Eyes: Negative.    Respiratory: Negative.     Cardiovascular: Negative.    Gastrointestinal: Negative.    Endocrine: Negative.    Genitourinary: Negative.    Musculoskeletal: Negative.    Skin: Negative.    Allergic/Immunologic: Negative.    Neurological: Negative.    Hematological: Negative.    Psychiatric/Behavioral: Negative.       Current Medications[1]  Allergies:Allergies[2]    Past Medical History[3]   Past Surgical History[4]   Family History[5]   Social History: Short Social Hx on File[6]     Objective:   Physical Exam  Vitals and nursing note reviewed.   Constitutional:       Appearance: Normal appearance.   HENT:      Head: Normocephalic and atraumatic.   Cardiovascular:      Rate and Rhythm: Normal rate and regular rhythm.      Pulses: Normal pulses.      Heart sounds: Normal heart sounds.   Pulmonary:      Effort: Pulmonary effort is normal.      Breath sounds: Normal breath sounds.   Musculoskeletal:         General: Normal range of motion.      Cervical back: Normal range of motion and neck supple.   Skin:     General: Skin is warm.   Neurological:      Mental Status: He is alert. Mental status is at baseline.   Psychiatric:         Mood and Affect: Mood normal.         Assessment & Plan:   No diagnosis found.  Right Baker's cyst ultrasound reviewed /he does have a hematoma on his right calf muscle  as well.  I did advise him to monitor, since he is on Eliquis avoid NSAIDs, can take Tylenol as needed, ice, if symptoms persist he needs to see orthopedic doctor I put referral in.       History of recurrent DVT I did advise him to continue with Eliquis        No orders of the defined types were placed in this encounter.      Meds This Visit:  Requested Prescriptions      No prescriptions requested or ordered in this encounter       Imaging & Referrals:  None            [1]   Current Outpatient Medications   Medication Sig Dispense Refill    propranolol 10 MG Oral Tab TAKE 1 TO 2 TABLETS BY MOUTH TWICE DAILY AS NEEDED 60 tablet 1    apixaban (ELIQUIS) 5 MG Oral Tab Take 1 tablet (5 mg total) by mouth 2 (two) times daily. 180 tablet 3    betamethasone dipropionate 0.05 % External Lotion 1 application to AA of face qd prn 60 mL 1    ketoconazole 2 % External Cream Apply 1 Application topically daily. 60 g 2    fluticasone propionate 50 MCG/ACT Nasal Suspension 2 sprays by Nasal route in the morning and 2 sprays before bedtime. (Patient not taking: Reported on 2025) 16 g 0    Spacer/Aero-Holding Chambers Does not apply Device Use with albuterol inhaler (Patient not taking: Reported on 2025) 1 each 0   [2] No Known Allergies  [3]   Past Medical History:   DVT (deep venous thrombosis) (HCC)    Skin cancer   [4] No past surgical history on file.  [5] No family history on file.  [6]   Social History  Socioeconomic History    Marital status:    Tobacco Use    Smoking status: Former     Current packs/day: 0.00     Types: Cigarettes     Quit date:      Years since quittin.5     Passive exposure: Never    Smokeless tobacco: Former     Types: Chew     Quit date: 10/21/2020    Tobacco comments:     daily chewer   Vaping Use    Vaping status: Never Used   Substance and Sexual Activity    Alcohol use: Yes     Comment: socially    Drug use: No

## 2025-07-30 ENCOUNTER — OFFICE VISIT (OUTPATIENT)
Facility: CLINIC | Age: 55
End: 2025-07-30
Payer: COMMERCIAL

## 2025-07-30 ENCOUNTER — HOSPITAL ENCOUNTER (OUTPATIENT)
Dept: GENERAL RADIOLOGY | Facility: HOSPITAL | Age: 55
Discharge: HOME | End: 2025-07-30
Attending: ORTHOPAEDIC SURGERY

## 2025-07-30 VITALS — BODY MASS INDEX: 26.77 KG/M2 | HEIGHT: 70 IN | WEIGHT: 187 LBS

## 2025-07-30 DIAGNOSIS — M23.91 INTERNAL DERANGEMENT OF RIGHT KNEE: Primary | ICD-10-CM

## 2025-07-30 DIAGNOSIS — R52 PAIN: ICD-10-CM

## 2025-07-30 PROCEDURE — 3008F BODY MASS INDEX DOCD: CPT | Performed by: ORTHOPAEDIC SURGERY

## 2025-07-30 PROCEDURE — 99203 OFFICE O/P NEW LOW 30 MIN: CPT | Performed by: ORTHOPAEDIC SURGERY

## 2025-07-30 PROCEDURE — 73564 X-RAY EXAM KNEE 4 OR MORE: CPT | Performed by: ORTHOPAEDIC SURGERY

## 2025-07-30 RX ORDER — TRAMADOL HYDROCHLORIDE 50 MG/1
50 TABLET ORAL EVERY 12 HOURS PRN
Qty: 20 TABLET | Refills: 0 | Status: SHIPPED | OUTPATIENT
Start: 2025-07-30

## 2025-08-07 ENCOUNTER — OFFICE VISIT (OUTPATIENT)
Dept: FAMILY MEDICINE CLINIC | Facility: CLINIC | Age: 55
End: 2025-08-07

## 2025-08-07 VITALS
DIASTOLIC BLOOD PRESSURE: 68 MMHG | HEIGHT: 70 IN | BODY MASS INDEX: 26.77 KG/M2 | HEART RATE: 60 BPM | WEIGHT: 187 LBS | SYSTOLIC BLOOD PRESSURE: 138 MMHG

## 2025-08-07 DIAGNOSIS — Z79.01 ON ANTICOAGULANT THERAPY: ICD-10-CM

## 2025-08-07 DIAGNOSIS — Z87.891 FORMER TOBACCO USE: ICD-10-CM

## 2025-08-07 DIAGNOSIS — Z86.718 HISTORY OF DVT (DEEP VEIN THROMBOSIS): ICD-10-CM

## 2025-08-07 DIAGNOSIS — Z12.11 SCREENING FOR COLON CANCER: ICD-10-CM

## 2025-08-07 DIAGNOSIS — F41.1 GENERALIZED ANXIETY DISORDER: ICD-10-CM

## 2025-08-07 DIAGNOSIS — M71.21 SYNOVIAL CYST OF RIGHT POPLITEAL SPACE: ICD-10-CM

## 2025-08-07 DIAGNOSIS — Z23 IMMUNIZATION DUE: ICD-10-CM

## 2025-08-07 DIAGNOSIS — Z12.5 PROSTATE CANCER SCREENING: ICD-10-CM

## 2025-08-07 DIAGNOSIS — Z00.00 ROUTINE MEDICAL EXAM: Primary | ICD-10-CM

## 2025-08-21 ENCOUNTER — HOSPITAL ENCOUNTER (OUTPATIENT)
Dept: MRI IMAGING | Age: 55
Discharge: HOME | End: 2025-08-21
Attending: ORTHOPAEDIC SURGERY

## 2025-08-21 DIAGNOSIS — M23.91 INTERNAL DERANGEMENT OF RIGHT KNEE: ICD-10-CM

## 2025-08-21 PROCEDURE — 73721 MRI JNT OF LWR EXTRE W/O DYE: CPT | Performed by: ORTHOPAEDIC SURGERY

## 2025-08-26 ENCOUNTER — OFFICE VISIT (OUTPATIENT)
Facility: CLINIC | Age: 55
End: 2025-08-26

## 2025-08-26 DIAGNOSIS — M71.21 BAKER'S CYST OF KNEE, RIGHT: ICD-10-CM

## 2025-08-26 DIAGNOSIS — R52 PAIN: Primary | ICD-10-CM

## 2025-08-26 RX ORDER — TRIAMCINOLONE ACETONIDE 40 MG/ML
40 INJECTION, SUSPENSION INTRA-ARTICULAR; INTRAMUSCULAR ONCE
Status: COMPLETED | OUTPATIENT
Start: 2025-08-26 | End: 2025-08-27

## 2025-08-27 RX ADMIN — TRIAMCINOLONE ACETONIDE 40 MG: 40 INJECTION, SUSPENSION INTRA-ARTICULAR; INTRAMUSCULAR at 09:12:00

## (undated) DIAGNOSIS — Z86.718 HISTORY OF DVT (DEEP VEIN THROMBOSIS): ICD-10-CM

## (undated) DIAGNOSIS — L21.9 SEBORRHEIC DERMATITIS: ICD-10-CM

## (undated) NOTE — ED AVS SNAPSHOT
Lana Smoke   MRN: [id number]    Department:  [facility] Department   Date of Visit:  6/4/2018           Disclosure     Insurance plans vary and the physician(s) referred by the ER may not be covered by your plan.  Please contact y CARE PHYSICIAN AT ONCE OR RETURN IMMEDIATELY TO THE EMERGENCY DEPARTMENT. If you have been prescribed any medication(s), please fill your prescription right away and begin taking the medication(s) as directed.   If you believe that any of the medications